# Patient Record
Sex: FEMALE | URBAN - METROPOLITAN AREA
[De-identification: names, ages, dates, MRNs, and addresses within clinical notes are randomized per-mention and may not be internally consistent; named-entity substitution may affect disease eponyms.]

---

## 2019-10-12 ENCOUNTER — INPATIENT (INPATIENT)
Facility: HOSPITAL | Age: 84
LOS: 5 days | Discharge: NURSING, REHAB OR LONG-TERM CARE | DRG: 551 | End: 2019-10-18
Attending: HOSPITALIST | Admitting: HOSPITALIST
Payer: MEDICARE

## 2019-10-12 VITALS
RESPIRATION RATE: 16 BRPM | HEART RATE: 88 BPM | DIASTOLIC BLOOD PRESSURE: 89 MMHG | OXYGEN SATURATION: 99 % | TEMPERATURE: 98 F | SYSTOLIC BLOOD PRESSURE: 196 MMHG

## 2019-10-12 DIAGNOSIS — I67.1 CEREBRAL ANEURYSM, NONRUPTURED: Chronic | ICD-10-CM

## 2019-10-12 DIAGNOSIS — Z90.710 ACQUIRED ABSENCE OF BOTH CERVIX AND UTERUS: Chronic | ICD-10-CM

## 2019-10-12 DIAGNOSIS — Z96.659 PRESENCE OF UNSPECIFIED ARTIFICIAL KNEE JOINT: Chronic | ICD-10-CM

## 2019-10-12 DIAGNOSIS — Z98.890 OTHER SPECIFIED POSTPROCEDURAL STATES: Chronic | ICD-10-CM

## 2019-10-12 LAB
ALBUMIN SERPL ELPH-MCNC: 4.1 G/DL — SIGNIFICANT CHANGE UP (ref 3.3–5)
ALP SERPL-CCNC: 78 U/L — SIGNIFICANT CHANGE UP (ref 40–120)
ALT FLD-CCNC: 22 U/L — SIGNIFICANT CHANGE UP (ref 10–45)
ANION GAP SERPL CALC-SCNC: 12 MMOL/L — SIGNIFICANT CHANGE UP (ref 5–17)
APPEARANCE UR: CLEAR — SIGNIFICANT CHANGE UP
APTT BLD: 28.4 SEC — SIGNIFICANT CHANGE UP (ref 27.5–36.3)
AST SERPL-CCNC: 24 U/L — SIGNIFICANT CHANGE UP (ref 10–40)
BACTERIA # UR AUTO: NEGATIVE — SIGNIFICANT CHANGE UP
BILIRUB SERPL-MCNC: 0.7 MG/DL — SIGNIFICANT CHANGE UP (ref 0.2–1.2)
BILIRUB UR-MCNC: NEGATIVE — SIGNIFICANT CHANGE UP
BUN SERPL-MCNC: 15 MG/DL — SIGNIFICANT CHANGE UP (ref 7–23)
CALCIUM SERPL-MCNC: 8.9 MG/DL — SIGNIFICANT CHANGE UP (ref 8.4–10.5)
CHLORIDE SERPL-SCNC: 103 MMOL/L — SIGNIFICANT CHANGE UP (ref 96–108)
CO2 SERPL-SCNC: 25 MMOL/L — SIGNIFICANT CHANGE UP (ref 22–31)
COLOR SPEC: YELLOW — SIGNIFICANT CHANGE UP
CREAT SERPL-MCNC: 0.85 MG/DL — SIGNIFICANT CHANGE UP (ref 0.5–1.3)
DIFF PNL FLD: NEGATIVE — SIGNIFICANT CHANGE UP
EPI CELLS # UR: 1 /HPF — SIGNIFICANT CHANGE UP
GLUCOSE SERPL-MCNC: 130 MG/DL — HIGH (ref 70–99)
GLUCOSE UR QL: NEGATIVE — SIGNIFICANT CHANGE UP
HCT VFR BLD CALC: 41.7 % — SIGNIFICANT CHANGE UP (ref 34.5–45)
HGB BLD-MCNC: 13.6 G/DL — SIGNIFICANT CHANGE UP (ref 11.5–15.5)
HYALINE CASTS # UR AUTO: 1 /LPF — SIGNIFICANT CHANGE UP (ref 0–2)
INR BLD: 1.06 RATIO — SIGNIFICANT CHANGE UP (ref 0.88–1.16)
KETONES UR-MCNC: NEGATIVE — SIGNIFICANT CHANGE UP
LEUKOCYTE ESTERASE UR-ACNC: NEGATIVE — SIGNIFICANT CHANGE UP
MCHC RBC-ENTMCNC: 30.2 PG — SIGNIFICANT CHANGE UP (ref 27–34)
MCHC RBC-ENTMCNC: 32.6 GM/DL — SIGNIFICANT CHANGE UP (ref 32–36)
MCV RBC AUTO: 92.7 FL — SIGNIFICANT CHANGE UP (ref 80–100)
NITRITE UR-MCNC: NEGATIVE — SIGNIFICANT CHANGE UP
NRBC # BLD: 0 /100 WBCS — SIGNIFICANT CHANGE UP (ref 0–0)
PH UR: 6 — SIGNIFICANT CHANGE UP (ref 5–8)
PLATELET # BLD AUTO: 274 K/UL — SIGNIFICANT CHANGE UP (ref 150–400)
POTASSIUM SERPL-MCNC: 4.1 MMOL/L — SIGNIFICANT CHANGE UP (ref 3.5–5.3)
POTASSIUM SERPL-SCNC: 4.1 MMOL/L — SIGNIFICANT CHANGE UP (ref 3.5–5.3)
PROT SERPL-MCNC: 7 G/DL — SIGNIFICANT CHANGE UP (ref 6–8.3)
PROT UR-MCNC: ABNORMAL
PROTHROM AB SERPL-ACNC: 12.2 SEC — SIGNIFICANT CHANGE UP (ref 10–12.9)
RBC # BLD: 4.5 M/UL — SIGNIFICANT CHANGE UP (ref 3.8–5.2)
RBC # FLD: 12.9 % — SIGNIFICANT CHANGE UP (ref 10.3–14.5)
RBC CASTS # UR COMP ASSIST: 3 /HPF — SIGNIFICANT CHANGE UP (ref 0–4)
SODIUM SERPL-SCNC: 140 MMOL/L — SIGNIFICANT CHANGE UP (ref 135–145)
SP GR SPEC: 1.02 — SIGNIFICANT CHANGE UP (ref 1.01–1.02)
TROPONIN T, HIGH SENSITIVITY RESULT: 15 NG/L — SIGNIFICANT CHANGE UP (ref 0–51)
UROBILINOGEN FLD QL: NEGATIVE — SIGNIFICANT CHANGE UP
WBC # BLD: 8.22 K/UL — SIGNIFICANT CHANGE UP (ref 3.8–10.5)
WBC # FLD AUTO: 8.22 K/UL — SIGNIFICANT CHANGE UP (ref 3.8–10.5)
WBC UR QL: 1 /HPF — SIGNIFICANT CHANGE UP (ref 0–5)

## 2019-10-12 RX ORDER — TETANUS TOXOID, REDUCED DIPHTHERIA TOXOID AND ACELLULAR PERTUSSIS VACCINE, ADSORBED 5; 2.5; 8; 8; 2.5 [IU]/.5ML; [IU]/.5ML; UG/.5ML; UG/.5ML; UG/.5ML
0.5 SUSPENSION INTRAMUSCULAR ONCE
Refills: 0 | Status: COMPLETED | OUTPATIENT
Start: 2019-10-12 | End: 2019-10-12

## 2019-10-12 RX ORDER — ACETAMINOPHEN 500 MG
975 TABLET ORAL ONCE
Refills: 0 | Status: COMPLETED | OUTPATIENT
Start: 2019-10-12 | End: 2019-10-12

## 2019-10-12 RX ADMIN — Medication 975 MILLIGRAM(S): at 21:00

## 2019-10-12 RX ADMIN — TETANUS TOXOID, REDUCED DIPHTHERIA TOXOID AND ACELLULAR PERTUSSIS VACCINE, ADSORBED 0.5 MILLILITER(S): 5; 2.5; 8; 8; 2.5 SUSPENSION INTRAMUSCULAR at 20:29

## 2019-10-12 RX ADMIN — Medication 975 MILLIGRAM(S): at 20:30

## 2019-10-12 NOTE — ED ADULT NURSE NOTE - NSIMPLEMENTINTERV_GEN_ALL_ED
Implemented All Universal Safety Interventions:  [place] to call system. Call bell, personal items and telephone within reach. Instruct patient to call for assistance. Room bathroom lighting operational. Non-slip footwear when patient is off stretcher. Physically safe environment: no spills, clutter or unnecessary equipment. Stretcher in lowest position, wheels locked, appropriate side rails in place. Specific interventions were implemented:

## 2019-10-12 NOTE — ED ADULT TRIAGE NOTE - BP NONINVASIVE DIASTOLIC (MM HG)
PROCEDURE NOTE: Lucentis 0.5mg PFS OD. Diagnosis: Neovascular AMD with Active CNV. Anesthesia: Akten Gel 3.5%. Prep: Betadine Flush. Prior to injection, risks/benefits/alternatives discussed including but not limited to infection, loss of vision or eye, hemorrhage, cataract, glaucoma, retinal tears or detachment. The patient wished to proceed with treatment. Topical anesthesia was induced with Alcaine. Additional anesthesia was achieved using drop(s) or injection checked above. A drop of Povidone-iodine 5% ophthalmic solution was instilled over the injection site and in the inferior fornix. Betadine prep was performed. A single use prefilled syringe of intravitreal Lucentis 0.5mg/0.05ml was used and excess discarded. The needle was passed 3.0 mm posterior to the limbus in pseudophakic patients, and 3.5 mm posterior to the limbus in phakic patients. The remainder of the Lucentis 0.5mg in the single-use vial was then discarded in a medical waste disposal container. The eye was irrigated with sterile irrigating solution. Patient tolerated the procedure well. There were no complications. Post procedure instructions given. CF vision checked. Injection Time 1:51PM. The patient was instructed to return for re-evaluation in approximately 4-12 weeks depending on his/her condition and was told to call immediately if vision decreases and/or if his/her eye becomes red, painful, and/or light sensitive. The patient was instructed to go to the emergency room or call 911 if unable to reach the doctor within an hour or two of trying or calling. Bisi Cordoba 89

## 2019-10-12 NOTE — ED PROVIDER NOTE - CARE PLAN
Principal Discharge DX:	Syncope Principal Discharge DX:	Syncope  Secondary Diagnosis:	Head injury  Secondary Diagnosis:	Neck pain on left side  Secondary Diagnosis:	Back pain

## 2019-10-12 NOTE — ED ADULT NURSE REASSESSMENT NOTE - NS ED NURSE REASSESS COMMENT FT1
EKG completed, pt placed on cardiac monitor showing NSR to the 90's. Safety and comfort measures provided and maintained, bed remains locked and in lowest position, side rails up for safety. Pt aware to call for assistance, call bell within reach, family remains at bedside.

## 2019-10-12 NOTE — ED PROVIDER NOTE - ATTENDING CONTRIBUTION TO CARE
83F BIBEMS s/p syncopal episode, per patient's daughter, neighbor witness patient getting out of her house onto her porch and then witnessed her collapsing to the ground. Patient does not know how she fell or what happened, does not remember falling. States that she woke up on the ground. Neighbor called EMS. Patient complaining of pain to head, mild, non-radiating, over abrasion, denies exacerbating/alleviating factors. Unknown last tetanus. Denies any dizziness/nausea/vomiting.    PMHx: Stroke (no residual, on Plavix), HTN, HLD, Hypothyroidism, aneurysm, carotid stenosis  PSHx: aneurysm clipping/repair; craniotomy, hysterectomy, TKR  Allergies: NKDA  SocHx: never smoker, denies drugs/ETOH.    PHYSICAL EXAMINATION:  VITALS REVIEWED.  Hypertensive, otherwise normal  GENERALIZED APPEARANCE:  Comfortable, no acute distress, ambulating without difficulty  SKIN:  Warm, dry, no cyanosis  HEAD:  Abrasion to L forehead with small hematoma, non-tender, bleeding  EYES:  Conjunctiva pink, no icterus  ENMT:  Mucus membranes moist, no stridor, PERRL, EOMI  NECK:  Supple, non-tender  CHEST AND RESPIRATORY:  Clear to auscultation B/L, good air entry B/L, equal chest expansion  HEART AND CARDIOVASCULAR:  Regular rate, no obvious murmur  ABDOMEN AND GI:  Soft, non-tender, non-distended.  No rebound, no guarding, no CVA-area tenderness  MSK: +Left cervical trapezium/paracervical tender without swelling or lesions. Lungs clear. +Left para thoracic/ posterior rib swelling/ tenderness and superficial abrasions.  EXTREMITIES:  No deformity, edema, or calf tenderness  NEURO: AAOx2 (knows name/place, does not know date/situation), gross motor and sensory intact    Impression:  Syncope, r/o bleed, r/o fx; r/o ACS; r/o UTI, r/o potassium abnormality, ?arrythmia, ?due to carotid stenosis; labs, imaging, tetanus, pain management given extensive history will require admission for syncope work up.

## 2019-10-12 NOTE — ED PROVIDER NOTE - PROGRESS NOTE DETAILS
[place] C-collar's changed, no cervical midline tender, left para cervical tender without swelling or lesions, Neuro- intact. Pt has left 1st metacarpal fx and informed to medicine Dr. Monson. Medicine team will consult a hand specialist in AM. Thumb spica applied. KAITLYN Hart MD: Rec'd signout on this patient who presented with fall s/p syncope, awaiting reads of CTH and CT C-spine. Rec'd call from radiology that CT C-spine shows C2 fx, CTA neck ordered as per radiology recommendations. NSG consulted for further eval. Pt remains in c-collar at this time, no new neurologic deficits noted.

## 2019-10-12 NOTE — ED ADULT NURSE NOTE - INTERVENTIONS DEFINITIONS
Reinforce activity limits and safety measures with patient and family/Physically safe environment: no spills, clutter or unnecessary equipment/Call bell, personal items and telephone within reach/[place] to call system/Stretcher in lowest position, wheels locked, appropriate side rails in place/Monitor for mental status changes and reorient to person, place, and time

## 2019-10-12 NOTE — ED PROVIDER NOTE - OBJECTIVE STATEMENT
84yo female pt with PMHx of Stroke (no residual, on Plavix), HTN, HLD, Hypothyroidism, was brought to ED by EMS c/o syncopal episode and fall this evening. Pt's A&Ox4 now. Pt stated she didn't not remember her fall and she found herself on the ground with head injury. As per her family, pt's neighbor saw her fall, pt fell down from her porch, and called 911. Denies dizziness, visual changes or N/V. Denies CP/SOB/ABD pain. Denies fever, chills or recent sickness. Denies urinary problems. Unknown last TD vaccination. 84yo female pt with PMHx of Stroke (no residual, on Plavix), HTN, HLD, Hypothyroidism, was brought to ED by EMS with cervical immobilization c/o syncopal episode and fall this evening. Pt's A&Ox4 now. Pt stated she didn't not remember her fall and she found herself on the ground with head injury. As per her family, pt's neighbor saw her fall, pt fell down from her porch, and called 911. Denies dizziness, visual changes or N/V. Denies CP/SOB/ABD pain. Denies fever, chills or recent sickness. Denies urinary problems. Unknown last TD vaccination. 84yo female pt with PMHx of Stroke (no residual, on Plavix), HTN, HLD, Hypothyroidism, was brought to ED by EMS with cervical immobilization c/o syncopal episode and fall this evening. Pt's A&Ox4 now. Pt stated she didn't not remember her fall and she found herself on the ground with head injury. As per her family, pt's neighbor saw her fall, pt fell down from her porch, and called 911. Denies dizziness, visual changes or N/V. Denies CP/SOB/ABD pain. Denies fever, chills or recent sickness. Denies urinary problems. Unknown last TD vaccination.  PMD- wKame Frances in [place]. 82yo female pt with PMHx of Stroke (no residual, on Plavix), Carotid Stenosis, HTN, HLD, Hypothyroidism, Brain Aneurysm s/p clipping, was brought to ED by EMS with cervical immobilization c/o syncopal episode and fall this evening. Pt's A&Ox4 now. Pt stated she didn't not remember her fall and she found herself on the ground with head injury. As per her family, pt's neighbor saw her fall, pt fell down from her porch, and called 911. Denies dizziness, visual changes or N/V. Denies CP/SOB/ABD pain. Denies fever, chills or recent sickness. Denies urinary problems. Unknown last TD vaccination.  PMD- Kwame Madrid in [place].

## 2019-10-12 NOTE — ED ADULT NURSE REASSESSMENT NOTE - NS ED NURSE REASSESS COMMENT FT1
As per war room, pt had an episode of inverted P waves on the cardiac monitor. MD Jimenez made aware, print out provided to MD. Safety and comfort measures maintained, repeat EKG completed and handed to MD.

## 2019-10-12 NOTE — ED ADULT NURSE NOTE - OBJECTIVE STATEMENT
84 y/o Female presenting to the ED by EMS, A&Ox3, after a witnessed single mechanical fall while outside her house, as per EMS report, no LOC noted during incident. Pt hit her head and is on Plavix. Pt arrived with a C-collar on and gauze wrapped on the forehead. Pt denies chest pain, shortness of breath, LOC, N/V/D, dizziness, blurry vision. Skin warm and dry. Safety and comfort measures provided and maintained, bed remains locked and in lowest position, side rails up for safety. Pt educated not to get up without assistance. 82 y/o Female presenting to the ED by EMS, A&Ox2, unable to answer orientation to time, as per family pt has difficulty getting her words out from a past stroke. Pt here after a witnessed single mechanical fall while outside her house, as per EMS report, no LOC noted during incident as per neighbor who witnessed the fall. However, pt reports she does not remember event Pt hit her head and is on Plavix. Pt arrived with a C-collar on and gauze wrapped on the forehead. Pt reports pain in the back side of her head and neck. Pt has had multiple falls in the past, as per family pt has not had any falls recently after discontinuation of her water pills. Pt denies chest pain, shortness of breath, LOC, N/V/D, dizziness, blurry vision. Skin warm and dry. Safety and comfort measures provided and maintained, bed remains locked and in lowest position, side rails up for safety. Pt educated not to get up without assistance. Family remains at bedside. Awaiting CT scan

## 2019-10-12 NOTE — ED PROVIDER NOTE - PHYSICAL EXAMINATION
NAD, A&Ox3, Hypertensive, Afebrile, + PERRL with full EOMs, + right forehead hematoma with superficial abrasions. No spinal midline tender. + Left cervical trapezium/ para cervical tender without swelling or lesions. Lungs clear. + Left para thoracic/ posterior rib swelling/ tender and superficial abrasions. No CVA tender. ABD soft, non tender. + Left buttock tender with full ROMS of hips. N/V- intact.

## 2019-10-12 NOTE — ED PROVIDER NOTE - PSH
Brain aneurysm  s/p repair  H/O abdominal hysterectomy    H/O total knee replacement    History of craniotomy

## 2019-10-12 NOTE — ED PROVIDER NOTE - PMH
Carotid stenosis    Hyperlipidemia    Hypertension    Hypothyroidism    Stroke  no residual deficits

## 2019-10-13 DIAGNOSIS — S62.309A UNSPECIFIED FRACTURE OF UNSPECIFIED METACARPAL BONE, INITIAL ENCOUNTER FOR CLOSED FRACTURE: ICD-10-CM

## 2019-10-13 DIAGNOSIS — I10 ESSENTIAL (PRIMARY) HYPERTENSION: ICD-10-CM

## 2019-10-13 DIAGNOSIS — R47.01 APHASIA: ICD-10-CM

## 2019-10-13 DIAGNOSIS — Z29.9 ENCOUNTER FOR PROPHYLACTIC MEASURES, UNSPECIFIED: ICD-10-CM

## 2019-10-13 DIAGNOSIS — S12.9XXA FRACTURE OF NECK, UNSPECIFIED, INITIAL ENCOUNTER: ICD-10-CM

## 2019-10-13 DIAGNOSIS — R55 SYNCOPE AND COLLAPSE: ICD-10-CM
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
## 2019-10-13 DIAGNOSIS — E03.9 HYPOTHYROIDISM, UNSPECIFIED: ICD-10-CM

## 2019-10-13 LAB — TROPONIN T, HIGH SENSITIVITY RESULT: 16 NG/L — SIGNIFICANT CHANGE UP (ref 0–51)

## 2019-10-13 PROCEDURE — 73130 X-RAY EXAM OF HAND: CPT | Mod: 26,LT

## 2019-10-13 PROCEDURE — 70498 CT ANGIOGRAPHY NECK: CPT | Mod: 26

## 2019-10-13 PROCEDURE — 12345: CPT | Mod: NC

## 2019-10-13 PROCEDURE — 99223 1ST HOSP IP/OBS HIGH 75: CPT | Mod: GC

## 2019-10-13 RX ORDER — TRAMADOL HYDROCHLORIDE 50 MG/1
25 TABLET ORAL ONCE
Refills: 0 | Status: DISCONTINUED | OUTPATIENT
Start: 2019-10-13 | End: 2019-10-13

## 2019-10-13 RX ORDER — LEVOTHYROXINE SODIUM 125 MCG
50 TABLET ORAL DAILY
Refills: 0 | Status: DISCONTINUED | OUTPATIENT
Start: 2019-10-13 | End: 2019-10-18

## 2019-10-13 RX ORDER — ACETAMINOPHEN 500 MG
650 TABLET ORAL ONCE
Refills: 0 | Status: COMPLETED | OUTPATIENT
Start: 2019-10-13 | End: 2019-10-13

## 2019-10-13 RX ORDER — SIMVASTATIN 20 MG/1
10 TABLET, FILM COATED ORAL AT BEDTIME
Refills: 0 | Status: DISCONTINUED | OUTPATIENT
Start: 2019-10-13 | End: 2019-10-17

## 2019-10-13 RX ADMIN — TRAMADOL HYDROCHLORIDE 25 MILLIGRAM(S): 50 TABLET ORAL at 20:12

## 2019-10-13 RX ADMIN — Medication 50 MICROGRAM(S): at 06:24

## 2019-10-13 RX ADMIN — SIMVASTATIN 10 MILLIGRAM(S): 20 TABLET, FILM COATED ORAL at 21:45

## 2019-10-13 RX ADMIN — Medication 650 MILLIGRAM(S): at 20:42

## 2019-10-13 RX ADMIN — TRAMADOL HYDROCHLORIDE 25 MILLIGRAM(S): 50 TABLET ORAL at 01:17

## 2019-10-13 RX ADMIN — Medication 650 MILLIGRAM(S): at 20:12

## 2019-10-13 NOTE — H&P ADULT - PROBLEM SELECTOR PLAN 5
vital signs per floor routine  c/w lisinopril 2.5mg qd (home dose) given patient hypertensive will check TSH level given hx hypothyroidism.   -c/w levothyroxine 50mcg qd for now

## 2019-10-13 NOTE — PROGRESS NOTE ADULT - SUBJECTIVE AND OBJECTIVE BOX
Prasad Boykin M.D. Pager Number [phone]    Patient is a 83y old  Female who presents with a chief complaint of cervical spine fracture (13 Oct 2019 04:38)      SUBJECTIVE / OVERNIGHT EVENTS:  Pt seen and examined at bedside in the presence of pt's nephew and niece. No acute events overnight. Complaining of back pain.   Pt denies cp, palpitations, sob, abd pain, N/V, fever, chills.    ROS:  All other review of systems negative    Allergies    No Known Allergies    Intolerances        MEDICATIONS  (STANDING):  levothyroxine 50 MICROGram(s) Oral daily  simvastatin 10 milliGRAM(s) Oral at bedtime    MEDICATIONS  (PRN):      Vital Signs Last 24 Hrs  T(C): 36.7 (13 Oct 2019 08:15), Max: 36.7 (13 Oct 2019 08:15)  T(F): 98 (13 Oct 2019 08:15), Max: 98 (13 Oct 2019 08:15)  HR: 69 (13 Oct 2019 08:15) (69 - 88)  BP: 157/81 (13 Oct 2019 08:15) (157/81 - 196/89)  BP(mean): --  RR: 18 (13 Oct 2019 08:15) (16 - 18)  SpO2: 96% (13 Oct 2019 08:15) (96% - 99%)  CAPILLARY BLOOD GLUCOSE        I&O's Summary    13 Oct 2019 07:01  -  13 Oct 2019 13:48  --------------------------------------------------------  IN: 0 mL / OUT: 600 mL / NET: -600 mL        PHYSICAL EXAM:  GENERAL: NAD, well-developed  HEAD:  Normocephalic, Right forehead hematoma  EYES: EOMI, PERRLA, conjunctiva and sclera clear  NECK: Cervical Collar in place  CHEST/LUNG: Clear to auscultation bilaterally; No wheeze  HEART: Regular rate and rhythm; No murmurs, rubs, or gallops  ABDOMEN: Soft, Nontender, Nondistended; Bowel sounds present  EXTREMITIES:  Left arm in splint with swelling and discoloration of digits.   NEUROLOGY: AAOx3, non-focal  PSYCH: calm  SKIN: No rashes or lesions    LABS:                        13.6   8.22  )-----------( 274      ( 12 Oct 2019 20:27 )             41.7     10-12    140  |  103  |  15  ----------------------------<  130<H>  4.1   |  25  |  0.85    Ca    8.9      12 Oct 2019 20:27    TPro  7.0  /  Alb  4.1  /  TBili  0.7  /  DBili  x   /  AST  24  /  ALT  22  /  AlkPhos  78  10-12    PT/INR - ( 12 Oct 2019 20:27 )   PT: 12.2 sec;   INR: 1.06 ratio         PTT - ( 12 Oct 2019 20:27 )  PTT:28.4 sec      Urinalysis Basic - ( 12 Oct 2019 22:18 )    Color: Yellow / Appearance: Clear / S.023 / pH: x  Gluc: x / Ketone: Negative  / Bili: Negative / Urobili: Negative   Blood: x / Protein: Trace / Nitrite: Negative   Leuk Esterase: Negative / RBC: 3 /hpf / WBC 1 /HPF   Sq Epi: x / Non Sq Epi: 1 /hpf / Bacteria: Negative        RADIOLOGY & ADDITIONAL TESTS:    Imaging Personally Reviewed:    Consultant(s) Notes Reviewed:      Care Discussed with Consultants/Other Providers:    Case Discussed with Family:    Goals of Care:

## 2019-10-13 NOTE — CONSULT NOTE ADULT - ASSESSMENT
83F s/p syncopal fall presents with neck pain and C2 pars fracture into the transverse foramen.     - Recommend CTA of the head and neck  - MRI C-spine due to midline tenderness on exam  - Keep C-collar for now  - Pain control  - Medicine eval for syncope

## 2019-10-13 NOTE — H&P ADULT - PROBLEM SELECTOR PLAN 1
unclear if syncopal vs mechanical fall as patient poor historian. will work up syncope w/ telemetry monitoring for arrythmia, obtain TTE to identify structural heart disease. CT head negative for acute infarct.  Check TSH level, orthostatics ?orthostatic hypotension. UA negative and patient w/o signs of infection.  -PT consult placed, social work c/s placed unclear if syncopal vs mechanical fall as patient poor historian. will work up syncope w/ telemetry monitoring for arrythmia, obtain TTE to identify structural heart disease. CT head negative for acute infarct.  Check TSH level, orthostatics ?orthostatic hypotension. UA negative and patient w/o signs of infection. b12, folate RPR   -PT consult placed, social work c/s placed unclear if syncopal vs mechanical fall as patient poor historian. will work up syncope w/ telemetry monitoring for arrythmia, obtain TTE to identify structural heart disease. CT head negative for acute infarct.  Check TSH level, orthostatics ?orthostatic hypotension. UA negative and patient w/o signs of infection. b12, folate, RPR r/o neuropathic causes of fall  -PT consult placed, social work c/s placed unclear if syncopal vs mechanical fall as patient poor historian. will work up syncope w/ telemetry monitoring for arrythmia, obtain TTE to identify structural heart disease. CT head negative for acute infarct.  Will get MR brain  Check TSH level, orthostatics ?orthostatic hypotension. UA negative and patient w/o signs of infection. b12, folate, RPR r/o neuropathic causes of fall  -PT consult placed, social work c/s placed Neurosurgery consulted for non-displaced C2 fracture through the left C2 pars into the transverse foramen, patient currently on [place] c-collar.   -CTA head and neck preformed for vasculature integrity given new cervical fracture pending official read  -MR c-spine ordered  -c/w Cervical collar until cleared by neurosurgery

## 2019-10-13 NOTE — PROGRESS NOTE ADULT - PROBLEM SELECTOR PLAN 1
-CT imaging reveals non-displaced C2 fracture through the left C2 pars into the transverse foramen  -Continue Cervical Collar  -Follow up with MRI  -Follow up with NSGY

## 2019-10-13 NOTE — CONSULT NOTE ADULT - SUBJECTIVE AND OBJECTIVE BOX
Pager: [phone]  CHIEF COMPLAINT/ REASON FOR CONSULTATION:    Neck pain     HPI:    82yo female pt with PMHx of Stroke (no residual, on Plavix), Carotid Stenosis, HTN, HLD, Hypothyroidism, Brain Aneurysm (in the 1970s) s/p clipping, was brought to ED by EMS with cervical immobilization c/o syncopal episode and fall this evening. Pt's A&Ox4 now. Pt stated she didn't not remember her fall and she found herself on the ground with head injury. As per her family, pt's neighbor saw her fall, pt fell down from her porch, and called 911. Denies dizziness, visual changes or N/V. Denies CP/SOB/ABD pain. Denies fever, chills or recent sickness. Denies urinary problems.    She reports some neck pain and was placed in a C-collar. Denies any weakness or paresthesias. CT of the neck was performed and demonstrated a non-dysplaced fracture through the left C2 pars into the transverse foramen.     PAST MEDICAL HISTORY   Carotid stenosis  Hypothyroidism  Hyperlipidemia  Hypertension  Stroke    PAST SURGICAL HISTORY   H/O abdominal hysterectomy  Brain aneurysm  H/O total knee replacement  History of craniotomy        MEDICATIONS:  Antibiotics:    Neuro:    Anticoagulation: Plavix     Other:      SOCIAL HISTORY:   Occupation:   Marital Status:     FAMILY HISTORY:      REVIEW OF SYSTEMS:  Check here if all are normal other than Neurological []  Negative except as above     PHYSICAL EXAMINATION:   T(C): 36.3 (10-13-19 @ 01:34), Max: 36.6 (10-12-19 @ 22:07)  HR: 81 (10-13-19 @ 01:34) (81 - 88)  BP: 167/83 (10-13-19 @ 01:34) (159/88 - 196/89)  RR: 18 (10-13-19 @ 01:34) (16 - 18)  SpO2: 97% (10-13-19 @ 01:34) (97% - 99%)  Wt(kg): --    Exam:  Awake, Alert, AOX3  PERRL, EOMI, Face equal, Tongue m/l  5/5 throughout, no drift  SILT    In C-collar  Pt reports significant midline tenderness     LABS:                        13.6   8.22  )-----------( 274      ( 12 Oct 2019 20:27 )             41.7     10-    140  |  103  |  15  ----------------------------<  130<H>  4.1   |  25  |  0.85    Ca    8.9      12 Oct 2019 20:27    TPro  7.0  /  Alb  4.1  /  TBili  0.7  /  DBili  x   /  AST  24  /  ALT  22  /  AlkPhos  78  10-12    PT/INR - ( 12 Oct 2019 20:27 )   PT: 12.2 sec;   INR: 1.06 ratio         PTT - ( 12 Oct 2019 20:27 )  PTT:28.4 sec  Urinalysis Basic - ( 12 Oct 2019 22:18 )    Color: Yellow / Appearance: Clear / S.023 / pH: x  Gluc: x / Ketone: Negative  / Bili: Negative / Urobili: Negative   Blood: x / Protein: Trace / Nitrite: Negative   Leuk Esterase: Negative / RBC: 3 /hpf / WBC 1 /HPF   Sq Epi: x / Non Sq Epi: 1 /hpf / Bacteria: Negative        RADIOLOGY & ADDITIONAL STUDIES:  < from: CT Cervical Spine No Cont (10.12.19 @ 21:57) >  IMPRESSION:    CT head: No displaced calvarial fracture or acute intracranial   hemorrhage. Large right frontal scalp hematoma. Chronic infarcts, as   above.    CT cervical spine: Acute fracture of the left aspect of C2 as described   above.  Recommend CTA of the cervical spine to exclude vascular injury.    Findings discussed with Dr Hart at 1:25 am on 10/13/19 with [facility]BV.    < end of copied text >

## 2019-10-13 NOTE — PROGRESS NOTE ADULT - PROBLEM SELECTOR PLAN 2
-Hx consistent with mechanical fall down flight of stairs outside the house  -Follow up with Syncope workup  -Follow up with PT

## 2019-10-13 NOTE — ED PROCEDURE NOTE - NS ED PERI VASCULAR NEG
no paresthesia/fingers/toes warm to touch/no cyanosis of extremity/capillary refill time < 2 seconds/swelling prior to the splint

## 2019-10-13 NOTE — H&P ADULT - PROBLEM SELECTOR PLAN 3
Seen on x-ray of left hand on 1st metacarpal bone. SPICA splint placed in ER. Will consult hand specialist in AM for further evaluation and management.

## 2019-10-13 NOTE — H&P ADULT - PROBLEM SELECTOR PLAN 6
IMPROVE score:1  given new fall w/ hematoma for DVT ppx will start b/l SCDs   Diet: DASH/TLC   fall/aspiration precautions. vital signs per floor routine  c/w lisinopril 2.5mg qd (home dose) given patient hypertensive vital signs per floor routine  will hold lisinopril 2.5mg qd (home dose) given unclear if acute stroke(would want to allow permissive hypertension), if MRI negative can resume vital signs per floor routine  c/w lisinopril 2.5mg qd (home dose)

## 2019-10-13 NOTE — H&P ADULT - PROBLEM SELECTOR PLAN 2
Neurosurgery consulted for non-displaced C2 fracture through the left C2 pars into the transverse foramen, patient currently on [place] c-collar.   -CTA head and neck preformed for vasculature integrity given new cervical fracture pending official read  -MR c-spine ordered  -c/w Cervical collar until cleared by neurosurgery unclear if syncopal vs mechanical fall as patient poor historian. will work up syncope w/ telemetry monitoring for arrythmia, obtain TTE to identify structural heart disease. CT head negative for acute infarct.  Will get MR brain  Check TSH level, orthostatics ?orthostatic hypotension. UA negative and patient w/o signs of infection. b12, folate, RPR r/o neuropathic causes of fall  -PT consult placed, social work c/s placed

## 2019-10-13 NOTE — H&P ADULT - NSHPLABSRESULTS_GEN_ALL_CORE
.  Labs reviewed personally.                          13.6   8.22  )-----------( 274      ( 12 Oct 2019 20:27 )             41.7     Hgb Trend: 13.6<--  10-12    140  |  103  |  15  ----------------------------<  130<H>  4.1   |  25  |  0.85    Ca    8.9      12 Oct 2019 20:27    TPro  7.0  /  Alb  4.1  /  TBili  0.7  /  DBili  x   /  AST  24  /  ALT  22  /  AlkPhos  78  10    Creatinine Trend: 0.85<--  PT/INR - ( 12 Oct 2019 20:27 )   PT: 12.2 sec;   INR: 1.06 ratio         PTT - ( 12 Oct 2019 20:27 )  PTT:28.4 sec  Urinalysis Basic - ( 12 Oct 2019 22:18 )    Color: Yellow / Appearance: Clear / S.023 / pH: x  Gluc: x / Ketone: Negative  / Bili: Negative / Urobili: Negative   Blood: x / Protein: Trace / Nitrite: Negative   Leuk Esterase: Negative / RBC: 3 /hpf / WBC 1 /HPF   Sq Epi: x / Non Sq Epi: 1 /hpf / Bacteria: Negative    Imaging reviewed personally.  CT head: No displaced calvarial fracture or acute intracranial hemorrhage. Large right frontal scalp hematoma. Chronic infarcts, as above.  CT cervical spine: Acute fracture of the left aspect of C2 as described above.  Recommend CTA of the cervical spine to exclude vascular injury.    EKG: NSR w/ premature supraventricular complexes rate 82, [id number] Labs reviewed personally.                          13.6   8.22  )-----------( 274      ( 12 Oct 2019 20:27 )             41.7     Hgb Trend: 13.6<--  10-12    140  |  103  |  15  ----------------------------<  130<H>  4.1   |  25  |  0.85    Ca    8.9      12 Oct 2019 20:27    TPro  7.0  /  Alb  4.1  /  TBili  0.7  /  DBili  x   /  AST  24  /  ALT  22  /  AlkPhos  78  10    Creatinine Trend: 0.85<--  PT/INR - ( 12 Oct 2019 20:27 )   PT: 12.2 sec;   INR: 1.06 ratio         PTT - ( 12 Oct 2019 20:27 )  PTT:28.4 sec  Urinalysis Basic - ( 12 Oct 2019 22:18 )    Color: Yellow / Appearance: Clear / S.023 / pH: x  Gluc: x / Ketone: Negative  / Bili: Negative / Urobili: Negative   Blood: x / Protein: Trace / Nitrite: Negative   Leuk Esterase: Negative / RBC: 3 /hpf / WBC 1 /HPF   Sq Epi: x / Non Sq Epi: 1 /hpf / Bacteria: Negative    Imaging reviewed personally.  CT head: No displaced calvarial fracture or acute intracranial hemorrhage. Large right frontal scalp hematoma. Chronic infarcts, as above.  CT cervical spine: Acute fracture of the left aspect of C2 as described above.  Recommend CTA of the cervical spine to exclude vascular injury.  XR pelvis:   Osseous structure adjacent to the superior aspect of the   left hip joint is likely related to degenerative changes rather than a   fracture.     If clinical suspicion persists, additional radiographic views of the left   hip may be obtained.    XR wrist: official report pending    CTA head/neck official report pending    EKG reviewed: NSR w/ premature supraventricular complexes rate 82, [id number]

## 2019-10-13 NOTE — PROGRESS NOTE ADULT - PROBLEM SELECTOR PLAN 3
-Xray of left hand revealed 1st metacarpal bone fracture  -S/p SPICA splint in ER.   -Follow up with Ortho/Hand specialist for evaluation and management.

## 2019-10-13 NOTE — H&P ADULT - NSHPPHYSICALEXAM_GEN_ALL_CORE
Gen: awake, alert, NAD  HENT: neck soft / supple, +subcutaneous hematoma on right side forehead  Lymph: no LAD noted in neck  Eye: PERRL, sclerae anicteric, ROM intact   CV: normal rate, regular rhythm, no murmers   Pulm: CTAB, no w/r/r auscultated  Abd: +BS, soft, NT, ND  Skin: warm, dry  Ext: no LE edema  Neuro: AOx2 (name, , location does not known date or president.   Psych: normal mood / affect Gen: awake, alert, NAD  HENT: neck soft / supple, +subcutaneous hematoma on right side forehead  Lymph: no LAD noted in neck  Eye: PERRL, sclerae anicteric, ROM intact   CV: normal rate, regular rhythm, no murmurs   Pulm: CTAB, no w/r/r auscultated  Abd: +BS, soft, NT, ND  Skin: warm, dry  Ext: no LE edema  Neuro: AOx2 (name, , location does not known date or president) 5/5 strngth upper and lower extremities,    Psych: normal mood / affect

## 2019-10-13 NOTE — H&P ADULT - ASSESSMENT
82 y/o Female hx Stroke on ASA&plavix, carotid stenosis, HTN, HLD, Hypothyroidism, Brain aneurysm s/p clipping presenting to the ED BIBEMS after witnessed fall in setting of possible vaso-vagal response found to have acute fracture of cervical spine & 1st metacarpal left hand.

## 2019-10-13 NOTE — H&P ADULT - HISTORY OF PRESENT ILLNESS
82 y/o Female hx Stroke on ASA&plavix, carotid stenosis, HTN, HLD, Hypothyroidism, Brain aneurysm s/p clipping presenting to the ED BIBEMS after witnessed fall.     Patient is poor historian, however reports not losing consciousness and slipping after standing from porch. She states she is unsure of exactly what happened but states she might of been slightly dizzy when she stood up. She denies any chest pain, palpations, SOB at the time. Chart notes have conflicting history, stating patient had LOC however patient denies this. Denies any fevers, chills, cough, urinary symptoms. 84 y/o Female hx Stroke (?residual aphasia) on ASA&plavix, carotid stenosis, HTN, HLD, Hypothyroidism, Brain aneurysm s/p clipping, dementia, presenting to the ED BIBEMS after witnessed fall.     Patient is poor historian, however reports not losing consciousness and slipping after standing from porch. She states she is unsure of exactly what happened but states she might of been slightly dizzy when she stood up. She denies any chest pain, palpations, SOB at the time. Chart notes have conflicting history, stating patient had LOC however patient denies this. Denies any fevers, chills, cough, urinary symptoms.

## 2019-10-13 NOTE — PROGRESS NOTE ADULT - PROBLEM SELECTOR PLAN 4
old,  CT brain w/ old Left frontoparietal encephalomalacia/gliosis and ex vacuo dilatation of the left lateral ventricle. Chronic lacunar infarct in the right centrum which may be contributory.  MRI brain ordered

## 2019-10-13 NOTE — H&P ADULT - NSICDXPASTMEDICALHX_GEN_ALL_CORE_FT
PAST MEDICAL HISTORY:  Carotid stenosis     Hyperlipidemia     Hypertension     Hypothyroidism     Stroke no residual deficits

## 2019-10-13 NOTE — H&P ADULT - PROBLEM SELECTOR PLAN 7
IMPROVE score:1  given new fall w/ hematoma for DVT ppx will start b/l SCDs   Diet: DASH/TLC   fall/aspiration precautions. IMPROVE score:1  given new fall w/ hematoma for DVT ppx will start b/l SCDs   Diet: DASH/TLC however will keep NPO w/ meds for now pending S&S evaluation given c2 fracture   fall/aspiration precautions.

## 2019-10-13 NOTE — H&P ADULT - PROBLEM SELECTOR PLAN 4
will check TSH level given hx hypothyroidism.   -c/w levothyroxine 50mcg qd for now Unclear if new or old, however CT brain w/ old Left frontoparietal encephalomalacia/gliosis and ex vacuo dilatation of the left lateral ventricle. Chronic lacunar infarct in the right centrum which may be contributory.   Will obtain MRI brain to r/o acute stroke.   semiovale. old,  CT brain w/ old Left frontoparietal encephalomalacia/gliosis and ex vacuo dilatation of the left lateral ventricle. Chronic lacunar infarct in the right centrum which may be contributory. old,  CT brain w/ old Left frontoparietal encephalomalacia/gliosis and ex vacuo dilatation of the left lateral ventricle. Chronic lacunar infarct in the right centrum which may be contributory.  MRI brain ordered

## 2019-10-13 NOTE — H&P ADULT - NSICDXPASTSURGICALHX_GEN_ALL_CORE_FT
PAST SURGICAL HISTORY:  Brain aneurysm s/p repair    H/O abdominal hysterectomy     H/O total knee replacement     History of craniotomy

## 2019-10-13 NOTE — PROGRESS NOTE ADULT - PROBLEM SELECTOR PLAN 7
IMPROVE score:1  given new fall w/ hematoma for DVT ppx will start b/l SCDs   Diet: DASH/TLC however will keep NPO w/ meds for now pending S&S evaluation given c2 fracture   fall/aspiration precautions. IMPROVE score:1  given new fall w/ hematoma for DVT ppx will start b/l SCDs   Diet: Mechanical soft diet  fall/aspiration precautions.

## 2019-10-13 NOTE — H&P ADULT - NSHPREVIEWOFSYSTEMS_GEN_ALL_CORE
Gen: denies fever, chills  HENT: denies throat pain, nasal congestion, +neck pain  Eye: denies changes in vision, eye pain  CV: denies chest pain, palpitations, +dizziness   Pulm: denies SOB, cough  Abd: denies abd pain, N/V/D/C  : denies hematuria, dysuria  Skin: denies rash, itching  Ext: denies LE edema, pain  Neuro: denies HA, +dizziness, lightheadedness

## 2019-10-14 LAB
ANION GAP SERPL CALC-SCNC: 9 MMOL/L — SIGNIFICANT CHANGE UP (ref 5–17)
APTT BLD: 27 SEC — LOW (ref 27.5–36.3)
BASOPHILS # BLD AUTO: 0.02 K/UL — SIGNIFICANT CHANGE UP (ref 0–0.2)
BASOPHILS NFR BLD AUTO: 0.3 % — SIGNIFICANT CHANGE UP (ref 0–2)
BUN SERPL-MCNC: 8 MG/DL — SIGNIFICANT CHANGE UP (ref 7–23)
CALCIUM SERPL-MCNC: 8.9 MG/DL — SIGNIFICANT CHANGE UP (ref 8.4–10.5)
CHLORIDE SERPL-SCNC: 104 MMOL/L — SIGNIFICANT CHANGE UP (ref 96–108)
CO2 SERPL-SCNC: 26 MMOL/L — SIGNIFICANT CHANGE UP (ref 22–31)
CREAT SERPL-MCNC: 0.62 MG/DL — SIGNIFICANT CHANGE UP (ref 0.5–1.3)
EOSINOPHIL # BLD AUTO: 0.12 K/UL — SIGNIFICANT CHANGE UP (ref 0–0.5)
EOSINOPHIL NFR BLD AUTO: 1.6 % — SIGNIFICANT CHANGE UP (ref 0–6)
FOLATE SERPL-MCNC: >20 NG/ML — SIGNIFICANT CHANGE UP
GLUCOSE SERPL-MCNC: 122 MG/DL — HIGH (ref 70–99)
HCT VFR BLD CALC: 37.1 % — SIGNIFICANT CHANGE UP (ref 34.5–45)
HGB BLD-MCNC: 12.6 G/DL — SIGNIFICANT CHANGE UP (ref 11.5–15.5)
IMM GRANULOCYTES NFR BLD AUTO: 0.5 % — SIGNIFICANT CHANGE UP (ref 0–1.5)
INR BLD: 1.08 RATIO — SIGNIFICANT CHANGE UP (ref 0.88–1.16)
LYMPHOCYTES # BLD AUTO: 0.82 K/UL — LOW (ref 1–3.3)
LYMPHOCYTES # BLD AUTO: 10.8 % — LOW (ref 13–44)
MAGNESIUM SERPL-MCNC: 2.1 MG/DL — SIGNIFICANT CHANGE UP (ref 1.6–2.6)
MCHC RBC-ENTMCNC: 31.2 PG — SIGNIFICANT CHANGE UP (ref 27–34)
MCHC RBC-ENTMCNC: 34 GM/DL — SIGNIFICANT CHANGE UP (ref 32–36)
MCV RBC AUTO: 91.8 FL — SIGNIFICANT CHANGE UP (ref 80–100)
MONOCYTES # BLD AUTO: 0.9 K/UL — SIGNIFICANT CHANGE UP (ref 0–0.9)
MONOCYTES NFR BLD AUTO: 11.9 % — SIGNIFICANT CHANGE UP (ref 2–14)
NEUTROPHILS # BLD AUTO: 5.66 K/UL — SIGNIFICANT CHANGE UP (ref 1.8–7.4)
NEUTROPHILS NFR BLD AUTO: 74.9 % — SIGNIFICANT CHANGE UP (ref 43–77)
NRBC # BLD: 0 /100 WBCS — SIGNIFICANT CHANGE UP (ref 0–0)
PHOSPHATE SERPL-MCNC: 2.2 MG/DL — LOW (ref 2.5–4.5)
PLATELET # BLD AUTO: 222 K/UL — SIGNIFICANT CHANGE UP (ref 150–400)
POTASSIUM SERPL-MCNC: 3.9 MMOL/L — SIGNIFICANT CHANGE UP (ref 3.5–5.3)
POTASSIUM SERPL-SCNC: 3.9 MMOL/L — SIGNIFICANT CHANGE UP (ref 3.5–5.3)
PROTHROM AB SERPL-ACNC: 12.4 SEC — SIGNIFICANT CHANGE UP (ref 10–12.9)
RBC # BLD: 4.04 M/UL — SIGNIFICANT CHANGE UP (ref 3.8–5.2)
RBC # FLD: 13 % — SIGNIFICANT CHANGE UP (ref 10.3–14.5)
SODIUM SERPL-SCNC: 139 MMOL/L — SIGNIFICANT CHANGE UP (ref 135–145)
T PALLIDUM AB TITR SER: NEGATIVE — SIGNIFICANT CHANGE UP
VIT B12 SERPL-MCNC: 1135 PG/ML — SIGNIFICANT CHANGE UP (ref 232–1245)
WBC # BLD: 7.56 K/UL — SIGNIFICANT CHANGE UP (ref 3.8–10.5)
WBC # FLD AUTO: 7.56 K/UL — SIGNIFICANT CHANGE UP (ref 3.8–10.5)

## 2019-10-14 PROCEDURE — 93306 TTE W/DOPPLER COMPLETE: CPT | Mod: 26

## 2019-10-14 PROCEDURE — 99233 SBSQ HOSP IP/OBS HIGH 50: CPT

## 2019-10-14 PROCEDURE — 70551 MRI BRAIN STEM W/O DYE: CPT | Mod: 26

## 2019-10-14 PROCEDURE — 72141 MRI NECK SPINE W/O DYE: CPT | Mod: 26

## 2019-10-14 RX ORDER — ACETAMINOPHEN 500 MG
650 TABLET ORAL EVERY 6 HOURS
Refills: 0 | Status: DISCONTINUED | OUTPATIENT
Start: 2019-10-14 | End: 2019-10-18

## 2019-10-14 RX ORDER — BACITRACIN ZINC 500 UNIT/G
1 OINTMENT IN PACKET (EA) TOPICAL
Refills: 0 | Status: DISCONTINUED | OUTPATIENT
Start: 2019-10-14 | End: 2019-10-18

## 2019-10-14 RX ADMIN — Medication 650 MILLIGRAM(S): at 17:42

## 2019-10-14 RX ADMIN — Medication 1 APPLICATION(S): at 17:42

## 2019-10-14 RX ADMIN — Medication 650 MILLIGRAM(S): at 18:00

## 2019-10-14 RX ADMIN — SIMVASTATIN 10 MILLIGRAM(S): 20 TABLET, FILM COATED ORAL at 22:09

## 2019-10-14 RX ADMIN — Medication 50 MICROGRAM(S): at 06:33

## 2019-10-14 NOTE — PHYSICAL THERAPY INITIAL EVALUATION ADULT - IMPAIRMENTS CONTRIBUTING TO GAIT DEVIATIONS, PT EVAL
decreased strength/impaired balance/pain/impaired postural control/decreased endurance ,mod unsteady

## 2019-10-14 NOTE — PROGRESS NOTE ADULT - PROBLEM SELECTOR PLAN 2
-Hx consistent with mechanical fall down flight of stairs outside the house  -Follow up with Syncope workup  -Follow up with PT history per my acocunt differnent than prior. Patient states fell inside where history initially states fell down outside steps  -patient states fell twice more in the past few months, never sought medical care, this time with no prodrome, does not remember how fell down stairs  -TTE normal, but given facial trauma, thumb trauma, unclear whether ?TIA, synocpe.  -agree with MR head.

## 2019-10-14 NOTE — PHYSICAL THERAPY INITIAL EVALUATION ADULT - PERTINENT HX OF CURRENT PROBLEM, REHAB EVAL
83yoF PMH Stroke (?residual aphasia) on ASA&plavix, carotid stenosis, HTN, HLD, Hypothyroidism, Brain aneurysm s/p clipping, dementia, presenting to the ED BIBEMS after witnessed fall  in setting of possible vaso-vagal response found to have acute fracture of cervical spine (C2 pars fracture into the transverse foramen) & 1st metacarpal left hand. 83yoF PMH Stroke (?residual aphasia) on ASA&plavix, carotid stenosis, HTN, HLD, Hypothyroidism, Brain aneurysm s/p clipping, dementia, presenting to the ED BIBEMS after witnessed fall  in setting of possible vaso-vagal response found to have acute fracture of cervical spine (C2 pars fracture into the transverse foramen) & 1st metacarpal left hand acute displaced comminuted fx of 1 st MC HEad neck junction on XRAY L Hand ; on MRi Cervical Spine : C2 foramen transversarium fx 83yoF PMH Stroke (?residual aphasia) on ASA&plavix, carotid stenosis, HTN, HLD, Hypothyroidism, Brain aneurysm s/p clipping, dementia, presenting to the ED BIBEMS after witnessed fall stepping into porch  in setting of possible vaso-vagal response found to have acute fracture of cervical spine (C2 pars fracture into the transverse foramen) & 1st metacarpal left hand acute displaced comminuted fx of 1 st MC HEad neck junction on XRAY L Hand ; on MRi Cervical Spine : C2 foramen transversarium fx 83yoF PMH Stroke (?residual aphasia) on ASA& plavix, carotid stenosis, HTN, HLD, Hypothyroidism, Brain aneurysm s/p clipping, dementia, presenting to the ED BIBEMS after witnessed fall stepping into porch  in setting of possible vaso-vagal response found to have acute fracture of cervical spine (C2 pars fracture into the transverse foramen) & 1st metacarpal left hand acute displaced comminuted fx of 1 st MC HEad neck junction on XRAY L Hand ; on MRi Cervical Spine : C2 foramen transversarium fx

## 2019-10-14 NOTE — PROGRESS NOTE ADULT - SUBJECTIVE AND OBJECTIVE BOX
Patient seen and examined at bedside.    --Anticoagulation--    T(C): 36.4 (10-14-19 @ 04:15), Max: 36.8 (10-13-19 @ 21:31)  HR: 79 (10-14-19 @ 04:15) (69 - 79)  BP: 160/80 (10-14-19 @ 04:15) (148/78 - 167/76)  RR: 18 (10-14-19 @ 04:15) (18 - 18)  SpO2: 96% (10-14-19 @ 04:15) (94% - 97%)  Wt(kg): --    Exam: Awake, Alert, AOX3  PERRL, EOMI, Face equal, Tongue m/l  5/5 throughout, no drift  SILT

## 2019-10-14 NOTE — PHYSICAL THERAPY INITIAL EVALUATION ADULT - PLANNED THERAPY INTERVENTIONS, PT EVAL
bed mobility training/stair train GOAL : pt will negotiate 4 steps with hand rail w/ supervision in 3 -4 weeks/transfer training/balance training/gait training/strengthening

## 2019-10-14 NOTE — PHYSICAL THERAPY INITIAL EVALUATION ADULT - IMPAIRMENTS FOUND, PT EVAL
gait, locomotion, and balance/joint integrity and mobility/muscle strength/posture/aerobic capacity/endurance

## 2019-10-14 NOTE — PHYSICAL THERAPY INITIAL EVALUATION ADULT - REFERRING PHYSICIAN, REHAB EVAL
Dr. Patricia. PT Eval & Tx Dr. Patricia. PT Eval & Tx; per Dr Parker pt clear from Neurosurgery for PT no surgical intervention at this time

## 2019-10-14 NOTE — PROGRESS NOTE ADULT - SUBJECTIVE AND OBJECTIVE BOX
Patient is a 83y old  Female who presents with a chief complaint of cervical spine fracture (13 Oct 2019 04:38)      SUBJECTIVE / OVERNIGHT EVENTS:  patient seen and evalutated. Upon further discussion of event, patient states she went outside and then back inside and the next thing she knew she was on the floor. While she states she did not lose consciousness, she did not have any recollection of how she felll, states did not feel dizzy or lightheadedness.  Patient with mild cervical pain, no pain in her hand.   ROS:  All other review of systems negative    Allergies    No Known Allergies    Intolerances        MEDICATIONS  (STANDING):  levothyroxine 50 MICROGram(s) Oral daily  simvastatin 10 milliGRAM(s) Oral at bedtime    MEDICATIONS  (PRN):      Vital Signs Last 24 Hrs  T(C): 36.7 (14 Oct 2019 13:12), Max: 36.8 (13 Oct 2019 21:31)  T(F): 98 (14 Oct 2019 13:12), Max: 98.2 (13 Oct 2019 21:31)  HR: 79 (14 Oct 2019 13:12) (73 - 79)  BP: 175/80 (14 Oct 2019 13:12) (148/78 - 175/80)  BP(mean): --  RR: 18 (14 Oct 2019 13:12) (18 - 18)  SpO2: 95% (14 Oct 2019 13:12) (94% - 96%)      I&O's Summary    13 Oct 2019 07:01  -  13 Oct 2019 13:48  --------------------------------------------------------  IN: 0 mL / OUT: 600 mL / NET: -600 mL        PHYSICAL EXAM:  GENERAL: NAD, well-developed  HEAD:  Normocephalic, Right forehead hematoma  EYES: EOMI, PERRLA, conjunctiva and sclera clear  NECK: Cervical Collar in place  CHEST/LUNG: Clear to auscultation bilaterally; No wheeze  HEART: Regular rate and rhythm; No murmurs, rubs, or gallops  ABDOMEN: Soft, Nontender, Nondistended; Bowel sounds present  EXTREMITIES:  Left arm in splint with swelling and discoloration of digits.   NEUROLOGY: AAOx3, non-focal  PSYCH: calm  SKIN: No rashes or lesions    LABS:                        13.6   8.22  )-----------( 274      ( 12 Oct 2019 20:27 )             41.7     10-12    140  |  103  |  15  ----------------------------<  130<H>  4.1   |  25  |  0.85    Ca    8.9      12 Oct 2019 20:27    TPro  7.0  /  Alb  4.1  /  TBili  0.7  /  DBili  x   /  AST  24  /  ALT  22  /  AlkPhos  78  10-12    PT/INR - ( 12 Oct 2019 20:27 )   PT: 12.2 sec;   INR: 1.06 ratio         PTT - ( 12 Oct 2019 20:27 )  PTT:28.4 sec      Urinalysis Basic - ( 12 Oct 2019 22:18 )    Color: Yellow / Appearance: Clear / S.023 / pH: x  Gluc: x / Ketone: Negative  / Bili: Negative / Urobili: Negative   Blood: x / Protein: Trace / Nitrite: Negative   Leuk Esterase: Negative / RBC: 3 /hpf / WBC 1 /HPF   Sq Epi: x / Non Sq Epi: 1 /hpf / Bacteria: Negative        RADIOLOGY & ADDITIONAL TESTS:    Imaging Personally Reviewed:    Consultant(s) Notes Reviewed:      Care Discussed with Consultants/Other Providers:    Case Discussed with Family:    Goals of Care: Patient is a 83y old  Female who presents with a chief complaint of cervical spine fracture (13 Oct 2019 04:38)      SUBJECTIVE / OVERNIGHT EVENTS:  patient seen and evalutated. Upon further discussion of event, patient states she went outside and then back inside and the next thing she knew she was on the floor. While she states she did not lose consciousness, she did not have any recollection of how she felll, states did not feel dizzy or lightheadedness.  Patient with mild cervical pain, no pain in her hand.   ROS:  All other review of systems negative    Allergies    No Known Allergies    Intolerances        MEDICATIONS  (STANDING):  levothyroxine 50 MICROGram(s) Oral daily  simvastatin 10 milliGRAM(s) Oral at bedtime    MEDICATIONS  (PRN):      Vital Signs Last 24 Hrs  T(C): 36.7 (14 Oct 2019 13:12), Max: 36.8 (13 Oct 2019 21:31)  T(F): 98 (14 Oct 2019 13:12), Max: 98.2 (13 Oct 2019 21:31)  HR: 79 (14 Oct 2019 13:12) (73 - 79)  BP: 175/80 (14 Oct 2019 13:12) (148/78 - 175/80)  BP(mean): --  RR: 18 (14 Oct 2019 13:12) (18 - 18)  SpO2: 95% (14 Oct 2019 13:12) (94% - 96%)      I&O's Summary    13 Oct 2019 07:01  -  13 Oct 2019 13:48  --------------------------------------------------------  IN: 0 mL / OUT: 600 mL / NET: -600 mL        PHYSICAL EXAM:  GENERA: NAD  HEAD:  Normocephalic, Right forehead hematoma  EYES: EOMI, PERRLA, conjunctiva and sclera clear  NECK: Cervical Collar in place  CHEST/LUNG: Clear to auscultation bilaterally; No wheeze  HEART: Regular rate and rhythm; No murmurs, rubs, or gallops  ABDOMEN: Soft, Nontender, Nondistended; Bowel sounds present  EXTREMITIES:  Left arm in splint with swelling and discoloration of digits.   NEUROLOGY: AAOx3, 5/5 UE and LE strength.   PSYCH: calm  SKIN: No rashes or lesions  Tele: No events.   LABS:                                   12.6   7.56  )-----------( 222      ( 14 Oct 2019 06:55 )             37.1     10-14    139  |  104  |  8   ----------------------------<  122<H>  3.9   |  26  |  0.62    Ca    8.9      14 Oct 2019 06:55  Phos  2.2     10-  Mg     2.1     10-14    TPro  7.0  /  Alb  4.1  /  TBili  0.7  /  DBili  x   /  AST  24  /  ALT  22  /  AlkPhos  78  10-      Color: Yellow / Appearance: Clear / S.023 / pH: x  Gluc: x / Ketone: Negative  / Bili: Negative / Urobili: Negative   Blood: x / Protein: Trace / Nitrite: Negative   Leuk Esterase: Negative / RBC: 3 /hpf / WBC 1 /HPF   Sq Epi: x / Non Sq Epi: 1 /hpf / Bacteria: Negative        RADIOLOGY & ADDITIONAL TESTS:    Imaging Personally Reviewed:    Consultant(s) Notes Reviewed:      Care Discussed with Consultants/Other Providers: Vascular cardiology attending,  neurosurgery resident, hand surgrey attending.     Case Discussed with Family:    Goals of Care:

## 2019-10-14 NOTE — PROGRESS NOTE ADULT - SUBJECTIVE AND OBJECTIVE BOX
Consult received and pending MRI of the C spine (and subsequent eval and clearance from Neurosurgery) will be able to evaluate patient fully and assess from a vascular medicine perspective if patient is a candidate for any endovascular intervention of the carotid artery stenosis. In the interim consider stroke neurologist eval.   Full consult to follow on 10/15/2019.

## 2019-10-14 NOTE — PHYSICAL THERAPY INITIAL EVALUATION ADULT - GENERAL OBSERVATIONS, REHAB EVAL
pt received in bed + external urinary device to wall suction , + cervical collar + tele SR 60-70's + splint L wrist spica over thumb c, d, I ace wrap c,d I L fingers ecchymotic min mod edema

## 2019-10-14 NOTE — CHART NOTE - NSCHARTNOTEFT_GEN_A_CORE
MEDICINE PA     EVENT SUMMARY     IMPRESSION:    < from: MR Cervical Spine No Cont (10.14.19 @ 18:02) >      EXAM:  MR SPINE CERVICAL                          EXAM:  MR BRAIN        Right frontal scalp hematoma, left frontotemporal craniotomy, left   frontal temporal and parietal encephalomalacia, lateral ventricle ex   vacuo enlargement, partially thrombosed left paraclinoid medially   projecting 1.2 x 1.1 cm aneurysm.    Volume loss and microvascular disease, no restricted diffusion, or   midline shift.    EGA dens fracture extending to left C2 C1-C2 articular facet please see   corresponding CT neck and CTA, abnormal hyperintense STIR signal along   the right lateral C4 and C5 facets with extension to the transverse   foramina additional fractures not excluded, alteration of the flow-void   within the right vertebral artery at this level, MRA with fat saturation   and attention to the vessels may better assess for possible small   dissection.    Redemonstration of a decreased size in caliber of the left common and   left internal carotid artery segment, as noted on CTA,. Dominant patent   right internal carotid artery,    Findings discussed with FRANCHESCA Clinton at immediate time review on 10/14/2019   at 6:40 PM.    < end of copied text >    NeuroSx made aware of the above results; no new recommendation at this time. C/w C- Collar and no acute surgical intervention. F/u with NSx in AM. Will continue to monitor.     Viviana DIXON  #[id number]

## 2019-10-14 NOTE — PROGRESS NOTE ADULT - PROBLEM SELECTOR PLAN 4
old,  CT brain w/ old Left frontoparietal encephalomalacia/gliosis and ex vacuo dilatation of the left lateral ventricle. Chronic lacunar infarct in the right centrum which may be contributory.  MRI brain ordered no further events but given aphasia and fall with severe carotid stenosis on L., MR brain is appropriate.

## 2019-10-14 NOTE — PHYSICAL THERAPY INITIAL EVALUATION ADULT - BED MOBILITY LIMITATIONS, REHAB EVAL
decreased ability to use legs for bridging/pushing/impaired ability to control trunk for mobility/pt sat x 8 min feet on floor and using Rue for support  cg of 1 intermittent min/decreased ability to use arms for pushing/pulling

## 2019-10-14 NOTE — PHYSICAL THERAPY INITIAL EVALUATION ADULT - GAIT DEVIATIONS NOTED, PT EVAL
decreased step length/decreased rosalinda/decreased stride length/decreased weight-shifting ability/increased time in double stance

## 2019-10-14 NOTE — PROGRESS NOTE ADULT - PROBLEM SELECTOR PLAN 3
-Xray of left hand revealed 1st metacarpal bone fracture  -S/p SPICA splint in ER.   -Follow up with Ortho/Hand specialist for evaluation and management. discussed personally with hand surgeon.  -this is an unstable fx, however no acuity to place pins.  -will see patient, but we will stabilize patient from other medical issues and hten will address need for surgery

## 2019-10-14 NOTE — PROGRESS NOTE ADULT - PROBLEM SELECTOR PLAN 1
-CT imaging reveals non-displaced C2 fracture through the left C2 pars into the transverse foramen  -Continue Cervical Collar  -Follow up with MRI  -Follow up with NSGY -CT imaging reveals non-displaced C2 fracture through the left C2 pars into the transverse foramen  -Continue Cervical Collar  -discussed with neurosurgery reisdent who states patient "cleared" from their standpoint without need for MR, but wanted to continue cervical collar.   -however given vascular carotid stenosis and possible need for intubation, will keep in MR cervical spine to look for ligamental damage and see if can remove C-collar.   -neurologically intact

## 2019-10-14 NOTE — PHYSICAL THERAPY INITIAL EVALUATION ADULT - ADDITIONAL COMMENTS
pt lives alone in house w/ 4 steps to enter with HR then no steps inside ; pt uses std cane PTA , walk in shower with shower seat

## 2019-10-14 NOTE — CHART NOTE - NSCHARTNOTEFT_GEN_A_CORE
83F s/p syncopal fall presents with neck pain and C2 pars fracture into the transverse foramen.   - imaging reviewed.   - CTA Negative  - Keep C-collar until outpatient follow up  - No acute neurosurgical intervention. Please follow up with Dr. Miranda 1 week after discharge.   - Continue with Pain control

## 2019-10-14 NOTE — PHYSICAL THERAPY INITIAL EVALUATION ADULT - TRANSFER TRAINING, PT EVAL
GOAL: pt will transfer sit-stand at [facility] cane with cg of 1 in 2-3 weeks

## 2019-10-14 NOTE — PROGRESS NOTE ADULT - PROBLEM SELECTOR PLAN 7
IMPROVE score:1  given new fall w/ hematoma for DVT ppx will start b/l SCDs   Diet: Mechanical soft diet  fall/aspiration precautions.

## 2019-10-14 NOTE — PHYSICAL THERAPY INITIAL EVALUATION ADULT - PRECAUTIONS/LIMITATIONS, REHAB EVAL
fall precautions/age indeterminate compression deformitiy T2 and T4 vertebra ; HCT L 1.1x 1.2 cm suprasellar paraclinoid likely thrombosed L supraclinoid ICA aneurysm; WBC 12.13 elevated fall precautions/age indeterminate compression deformity T2 and T4 vertebra ; HCT L 1.1x 1.2 cm suprasellar paraclinoid likely thrombosed L supraclinoid ICA aneurysm; WBC 12.13 elevated

## 2019-10-14 NOTE — PHYSICAL THERAPY INITIAL EVALUATION ADULT - IMPAIRMENTS CONTRIBUTING IMPAIRED BED MOBILITY, REHAB EVAL
impaired postural control/decreased endurance , pain in sitting4/10 lanette Sandra in gave tylenol/decreased strength/impaired balance/pain

## 2019-10-14 NOTE — PHYSICAL THERAPY INITIAL EVALUATION ADULT - IMPAIRED TRANSFERS: SIT/STAND, REHAB EVAL
impaired postural control/impaired balance/pain/pt decreased endurance , retropulsed in stand work on weight shift and balance pt using std cane in r hand mod of 1/decreased strength

## 2019-10-14 NOTE — PHYSICAL THERAPY INITIAL EVALUATION ADULT - ASR WT BEARING STATUS EVAL
no weight-bearing restrictions L hand kept NWb for now due to fx thumb/Left UE/no weight-bearing restrictions

## 2019-10-14 NOTE — PHYSICAL THERAPY INITIAL EVALUATION ADULT - ACTIVE RANGE OF MOTION EXAMINATION, REHAB EVAL
following spinal precautions/bilateral upper extremity Active ROM was WFL (within functional limits)/bilateral  lower extremity Active ROM was WFL (within functional limits)

## 2019-10-15 LAB
ANION GAP SERPL CALC-SCNC: 11 MMOL/L — SIGNIFICANT CHANGE UP (ref 5–17)
BUN SERPL-MCNC: 8 MG/DL — SIGNIFICANT CHANGE UP (ref 7–23)
CALCIUM SERPL-MCNC: 8.7 MG/DL — SIGNIFICANT CHANGE UP (ref 8.4–10.5)
CHLORIDE SERPL-SCNC: 105 MMOL/L — SIGNIFICANT CHANGE UP (ref 96–108)
CO2 SERPL-SCNC: 23 MMOL/L — SIGNIFICANT CHANGE UP (ref 22–31)
CREAT SERPL-MCNC: 0.6 MG/DL — SIGNIFICANT CHANGE UP (ref 0.5–1.3)
GLUCOSE SERPL-MCNC: 98 MG/DL — SIGNIFICANT CHANGE UP (ref 70–99)
HCT VFR BLD CALC: 38.9 % — SIGNIFICANT CHANGE UP (ref 34.5–45)
HGB BLD-MCNC: 12.7 G/DL — SIGNIFICANT CHANGE UP (ref 11.5–15.5)
MCHC RBC-ENTMCNC: 30.5 PG — SIGNIFICANT CHANGE UP (ref 27–34)
MCHC RBC-ENTMCNC: 32.6 GM/DL — SIGNIFICANT CHANGE UP (ref 32–36)
MCV RBC AUTO: 93.3 FL — SIGNIFICANT CHANGE UP (ref 80–100)
NRBC # BLD: 0 /100 WBCS — SIGNIFICANT CHANGE UP (ref 0–0)
PLATELET # BLD AUTO: 249 K/UL — SIGNIFICANT CHANGE UP (ref 150–400)
POTASSIUM SERPL-MCNC: 3.9 MMOL/L — SIGNIFICANT CHANGE UP (ref 3.5–5.3)
POTASSIUM SERPL-SCNC: 3.9 MMOL/L — SIGNIFICANT CHANGE UP (ref 3.5–5.3)
RBC # BLD: 4.17 M/UL — SIGNIFICANT CHANGE UP (ref 3.8–5.2)
RBC # FLD: 13.2 % — SIGNIFICANT CHANGE UP (ref 10.3–14.5)
SODIUM SERPL-SCNC: 139 MMOL/L — SIGNIFICANT CHANGE UP (ref 135–145)
WBC # BLD: 11.65 K/UL — HIGH (ref 3.8–10.5)
WBC # FLD AUTO: 11.65 K/UL — HIGH (ref 3.8–10.5)

## 2019-10-15 PROCEDURE — 99223 1ST HOSP IP/OBS HIGH 75: CPT

## 2019-10-15 PROCEDURE — 70549 MR ANGIOGRAPH NECK W/O&W/DYE: CPT | Mod: 26

## 2019-10-15 PROCEDURE — 99233 SBSQ HOSP IP/OBS HIGH 50: CPT

## 2019-10-15 PROCEDURE — 70546 MR ANGIOGRAPH HEAD W/O&W/DYE: CPT | Mod: 26

## 2019-10-15 RX ORDER — ACETAMINOPHEN 500 MG
1000 TABLET ORAL ONCE
Refills: 0 | Status: COMPLETED | OUTPATIENT
Start: 2019-10-15 | End: 2019-10-15

## 2019-10-15 RX ADMIN — Medication 650 MILLIGRAM(S): at 23:00

## 2019-10-15 RX ADMIN — Medication 650 MILLIGRAM(S): at 15:19

## 2019-10-15 RX ADMIN — Medication 1000 MILLIGRAM(S): at 19:00

## 2019-10-15 RX ADMIN — Medication 50 MICROGRAM(S): at 05:37

## 2019-10-15 RX ADMIN — Medication 650 MILLIGRAM(S): at 16:05

## 2019-10-15 RX ADMIN — Medication 1 APPLICATION(S): at 16:08

## 2019-10-15 RX ADMIN — Medication 400 MILLIGRAM(S): at 18:55

## 2019-10-15 RX ADMIN — SIMVASTATIN 10 MILLIGRAM(S): 20 TABLET, FILM COATED ORAL at 22:02

## 2019-10-15 RX ADMIN — Medication 1 APPLICATION(S): at 05:40

## 2019-10-15 RX ADMIN — Medication 650 MILLIGRAM(S): at 22:03

## 2019-10-15 NOTE — CONSULT NOTE ADULT - SUBJECTIVE AND OBJECTIVE BOX
Vascular Cardiology Consult Note     [place] [id number]              EMAIL [email or web address]   OFFICE [phone]    CC:  L common carotid stenosis and L internal carotid artery stenosis    HPI:    84 y/o F with PMH of CVA ( ? residual aphasia     For PE:  Provoked/Unprovoked? (long haul air travel, immobility, surgery)  Duration of symptoms?  Leg pain or swelling?  Cancer Screening: (mammo, pap, colonoscopy, prostate?)  Syncope?  Prior VTE?  Family history of VTE?  OCP use?  Prior bleeding?    For PAD:  Claudication?  Ulceration on feet?  Prior vascular testing?  Podiatrist?  Smoker?  On antiplatelet and statin therapy?  Concern for infection/cellulitis?  Renal function?  Prior stents/intervention?  Allergy to contrast?           Allergies    No Known Allergies    Intolerances    	    MEDICATIONS:        acetaminophen   Tablet .. 650 milliGRAM(s) Oral every 6 hours PRN      levothyroxine 50 MICROGram(s) Oral daily  simvastatin 10 milliGRAM(s) Oral at bedtime    BACItracin   Ointment 1 Application(s) Topical two times a day      PAST MEDICAL & SURGICAL HISTORY:  Carotid stenosis  Hypothyroidism  Hyperlipidemia  Hypertension  Stroke: no residual deficits  H/O abdominal hysterectomy  Brain aneurysm: s/p repair  H/O total knee replacement  History of craniotomy      FAMILY HISTORY:  No pertinent family history: stroke      SOCIAL HISTORY:  unchanged    REVIEW OF SYSTEMS:  CONSTITUTIONAL: No fever, weight loss, or fatigue  EYES: No eye pain, visual disturbances, or discharge  ENMT:  No difficulty hearing, tinnitus, vertigo; No sinus or throat pain  NECK: No pain or stiffness  RESPIRATORY: No cough, wheezing, chills or hemoptysis; No Shortness of Breath    CARDIOVASCULAR: No chest pain, palpitations, passing out, dizziness, or leg swelling    GASTROINTESTINAL: No abdominal or epigastric pain. No nausea, vomiting, or hematemesis; No diarrhea or constipation. No melena or hematochezia.  GENITOURINARY: No dysuria, frequency, hematuria, or incontinence  NEUROLOGICAL: No headaches, memory loss, loss of strength, numbness, or tremors  SKIN: No itching, burning, rashes, or lesions   LYMPH Nodes: No enlarged glands  ENDOCRINE: No heat or cold intolerance; No hair loss  MUSCULOSKELETAL: No joint pain or swelling; No muscle, back, or extremity pain  PSYCHIATRIC: No depression, anxiety, mood swings, or difficulty sleeping  HEME/LYMPH: No easy bruising, or bleeding gums  ALLERY AND IMMUNOLOGIC: No hives or eczema	    [ x] All others negative	  [ ] Unable to obtain    PHYSICAL EXAM:  T(C): 36.7 (10-15-19 @ 04:37), Max: 36.8 (10-14-19 @ 21:13)  HR: 80 (10-15-19 @ 04:37) (71 - 80)  BP: 159/73 (10-15-19 @ 04:37) (152/78 - 175/80)  RR: 18 (10-15-19 @ 04:37) (18 - 18)  SpO2: 96% (10-15-19 @ 04:37) (95% - 98%)  Wt(kg): --  I&O's Summary    14 Oct 2019 07:01  -  15 Oct 2019 07:00  --------------------------------------------------------  IN: 510 mL / OUT: 1550 mL / NET: -1040 mL        Appearance: Normal	  HEENT:   Normal oral mucosa, PERRL, EOMI	  Carotid:   Right:  No Bruit      Left:  No bruit  Lymphatic: No lymphadenopathy  Cardiovascular: Normal S1 S2, No JVD, No murmurs  Respiratory: Lungs clear to auscultation	  Psychiatry: A & O x 3, Mood & affect appropriate  Gastrointestinal:  Soft, Non-tender, + BS	  Skin: No rashes, No ecchymoses, No cyanosis	  Neurologic: Non-focal  Extremities: Normal range of motion.    Vascular Pulse Exam:  Right Femoral:  Palpable       Left Femoral:  Palpable  Right Popliteal:  Palpable      Left Popliteal:  Palpable  Right DP:  Palpable                 Left DP:  Palpable  Right PT:  Palpable                 Left DP:  Palpable    Foot Exam:  Warm, no ulceration.        LABS:	 	    CBC Full  -  ( 15 Oct 2019 07:12 )  WBC Count : 11.65 K/uL  Hemoglobin : 12.7 g/dL  Hematocrit : 38.9 %  Platelet Count - Automated : 249 K/uL  Mean Cell Volume : 93.3 fl  Mean Cell Hemoglobin : 30.5 pg  Mean Cell Hemoglobin Concentration : 32.6 gm/dL  Auto Neutrophil # : x  Auto Lymphocyte # : x  Auto Monocyte # : x  Auto Eosinophil # : x  Auto Basophil # : x  Auto Neutrophil % : x  Auto Lymphocyte % : x  Auto Monocyte % : x  Auto Eosinophil % : x  Auto Basophil % : x    10-15    139  |  105  |  8   ----------------------------<  98  3.9   |  23  |  0.60  10-14    139  |  104  |  8   ----------------------------<  122<H>  3.9   |  26  |  0.62    Ca    8.7      15 Oct 2019 07:11  Ca    8.9      14 Oct 2019 06:55  Phos  2.2     10-14  Mg     2.1     10-14            Assessment:  1.            Plan:  1.          Thank you      Vascular Cardiology Service    Please call with any questions:   SPECTRA - [id number]  Office [phone]  email:  [email or web address] Vascular Cardiology Consult Note     [place] [id number]              EMAIL [email or web address]   OFFICE [phone]    CC:      HPI:    DO NOT COPY FORWARD HPI       Allergies    No Known Allergies    Intolerances    	    MEDICATIONS:        acetaminophen   Tablet .. 650 milliGRAM(s) Oral every 6 hours PRN      levothyroxine 50 MICROGram(s) Oral daily  simvastatin 10 milliGRAM(s) Oral at bedtime    BACItracin   Ointment 1 Application(s) Topical two times a day      PAST MEDICAL & SURGICAL HISTORY:  Carotid stenosis  Hypothyroidism  Hyperlipidemia  Hypertension  Stroke: no residual deficits  H/O abdominal hysterectomy  Brain aneurysm: s/p repair  H/O total knee replacement  History of craniotomy      FAMILY HISTORY:  No pertinent family history: stroke      SOCIAL HISTORY:  unchanged    REVIEW OF SYSTEMS:  CONSTITUTIONAL: No fever, weight loss, or fatigue  EYES: No eye pain, visual disturbances, or discharge  ENMT:  No difficulty hearing, tinnitus, vertigo; No sinus or throat pain  NECK: No pain or stiffness  RESPIRATORY:    CARDIOVASCULAR:    GASTROINTESTINAL: No abdominal or epigastric pain. No nausea, vomiting, or hematemesis; No diarrhea or constipation. No melena or hematochezia.  GENITOURINARY: No dysuria, frequency, hematuria, or incontinence  NEUROLOGICAL: No headaches, memory loss, loss of strength, numbness, or tremors  SKIN:   LYMPH Nodes: No enlarged glands  ENDOCRINE: No heat or cold intolerance; No hair loss  MUSCULOSKELETAL: No joint pain or swelling; No muscle, back, or extremity pain  PSYCHIATRIC: No depression, anxiety, mood swings, or difficulty sleeping  HEME/LYMPH: No easy bruising, or bleeding gums  ALLERY AND IMMUNOLOGIC: No hives or eczema	    [ x] All others negative	  [ ] Unable to obtain    PHYSICAL EXAM:  T(C): 36.7 (10-15-19 @ 04:37), Max: 36.8 (10-14-19 @ 21:13)  HR: 80 (10-15-19 @ 04:37) (71 - 80)  BP: 159/73 (10-15-19 @ 04:37) (152/78 - 175/80)  RR: 18 (10-15-19 @ 04:37) (18 - 18)  SpO2: 96% (10-15-19 @ 04:37) (95% - 98%)  Wt(kg): --  I&O's Summary    14 Oct 2019 07:01  -  15 Oct 2019 07:00  --------------------------------------------------------  IN: 510 mL / OUT: 1550 mL / NET: -1040 mL        Appearance:  	  HEENT:   Normal oral mucosa, PERRL, EOMI	  Carotid:   Right:    Left:    Lymphatic: No lymphadenopathy  Cardiovascular:    Respiratory:  	  Psychiatry:  AAO x   Gastrointestinal:  Soft, Non-tender, + BS	  Skin: No rashes, No ecchymoses, No cyanosis	  Neurologic:    Extremities:      Vascular Pulse Exam:  Right DP: []palpable []non-palpable []audible      Left DP :   []palpable []non-palpable []audible  Right PT: []palpable [] non-palpable []audible   Left PT:  [] palpable [] non-palpable []audible         Foot Exam:        LABS:	 	    CBC Full  -  ( 15 Oct 2019 07:12 )  WBC Count : 11.65 K/uL  Hemoglobin : 12.7 g/dL  Hematocrit : 38.9 %  Platelet Count - Automated : 249 K/uL  Mean Cell Volume : 93.3 fl  Mean Cell Hemoglobin : 30.5 pg  Mean Cell Hemoglobin Concentration : 32.6 gm/dL  Auto Neutrophil # : x  Auto Lymphocyte # : x  Auto Monocyte # : x  Auto Eosinophil # : x  Auto Basophil # : x  Auto Neutrophil % : x  Auto Lymphocyte % : x  Auto Monocyte % : x  Auto Eosinophil % : x  Auto Basophil % : x    10-15    139  |  105  |  8   ----------------------------<  98  3.9   |  23  |  0.60  10-14    139  |  104  |  8   ----------------------------<  122<H>  3.9   |  26  |  0.62    Ca    8.7      15 Oct 2019 07:11  Ca    8.9      14 Oct 2019 06:55  Phos  2.2     10-14  Mg     2.1     10-14            Assessment:  1.            Plan:  1.          Thank you      Vascular Cardiology Service    Please call with any questions:   [place]  [id number]  Office [phone]  email:  [email or web address] Vascular Cardiology Consult Note     [place] [id number]              EMAIL [email or web address]   OFFICE [phone]    CC:  L common carotid stenosis and L internal carotid artery stenosis    HPI:    84 y/o Female PMH ? CVA (s/p Craniotomy 40 yrs ago/ on ASA & plavix), Carotid stenosis, HTN, HLD, Hypothyroidism, Brain aneurysm (s/p clipping) presenting to the ED BIBEMS after witnessed fall in setting of possible vaso-vagal response found to have with neck pain and C2 pars fracture into the transverse foramen & 1st metacarpal left hand. Patient is a poor historian states; that she has had multiple falls in the past which she states that occasionally these falls are associated with ? dizziness.   Patient was assessed by Neurosurgery and current recommendations were for C- collar to remain until outpatient follow up and no need for acute neurosurgical intervention.   Vascular medicine eval for left common carotid artery with progressive narrowing to occlusion beginning at its origin, with reconstitution at its bifurcation and stenosis of the L ICA; patient is unaware fo any prior diagnosis of this and unable to say if she has had any prior workup   Further denies any chest pain, shortness of breath, radiation to the neck,        Allergies  No Known Allergies    Intolerances    MEDICATIONS:  acetaminophen   Tablet .. 650 milliGRAM(s) Oral every 6 hours PRN  levothyroxine 50 MICROGram(s) Oral daily  simvastatin 10 milliGRAM(s) Oral at bedtime  BACItracin   Ointment 1 Application(s) Topical two times a day      PAST MEDICAL & SURGICAL HISTORY:  Carotid stenosis  Hypothyroidism  Hyperlipidemia  Hypertension  Stroke: no residual deficits  H/O abdominal hysterectomy  Brain aneurysm: s/p repair  H/O total knee replacement  History of craniotomy      FAMILY HISTORY:  No pertinent family history: stroke      SOCIAL HISTORY:  unchanged    REVIEW OF SYSTEMS:  CONSTITUTIONAL: No fever, weight loss, or fatigue  EYES: No eye pain, visual disturbances, or discharge  ENMT:  No difficulty hearing, tinnitus, vertigo; No sinus or throat pain  NECK: No pain or stiffness  RESPIRATORY:  no shortness of breath, cough, dyspnea   CARDIOVASCULAR:  no chest pain, shortness of breath, lower extremity edema   GASTROINTESTINAL: No abdominal or epigastric pain. No nausea, vomiting, or hematemesis; No diarrhea or constipation. No melena or hematochezia.  GENITOURINARY: No dysuria, frequency, hematuria, or incontinence  NEUROLOGICAL: No headaches, memory loss, loss of strength, numbness, or tremors  SKIN: no skin changes   LYMPH Nodes: No enlarged glands  ENDOCRINE: No heat or cold intolerance; No hair loss  MUSCULOSKELETAL: No joint pain or swelling; No muscle, back, or extremity pain  PSYCHIATRIC: No depression, anxiety, mood swings, or difficulty sleeping  HEME/LYMPH: No easy bruising, or bleeding gums  ALLERY AND IMMUNOLOGIC: No hives or eczema	    [ x] All others negative	  [ ] Unable to obtain    PHYSICAL EXAM:  T(C): 36.7 (10-15-19 @ 04:37), Max: 36.8 (10-14-19 @ 21:13)  HR: 80 (10-15-19 @ 04:37) (71 - 80)  BP: 159/73 (10-15-19 @ 04:37) (152/78 - 175/80)  RR: 18 (10-15-19 @ 04:37) (18 - 18)  SpO2: 96% (10-15-19 @ 04:37) (95% - 98%)  Wt(kg): --  I&O's Summary    14 Oct 2019 07:01  -  15 Oct 2019 07:00  --------------------------------------------------------  IN: 510 mL / OUT: 1550 mL / NET: -1040 mL        Appearance:  	  HEENT:   Normal oral mucosa, PERRL, EOMI; + post surgical changes on the frontal region of the and + L frontal hematoma   Carotid: UNABLE TO ASSESS DUE TO C- COLLAR IN PLACE   Lymphatic: No lymphadenopathy  Cardiovascular:  + s1 s2 RRR  Respiratory:  CTA b/l no wheezing rhonchi or rales   Psychiatry:  AAO x 3  Gastrointestinal:  Soft, Non-tender, + BS	  Skin: No rashes, No ecchymoses, No cyanosis	  Neurologic:  no focal neurologic deficits   Extremities:  no edema cyanosis or clubbing     Vascular Pulse Exam:  Right DP: []palpable []non-palpable []audible      Left DP :   []palpable []non-palpable []audible  Right PT: []palpable [] non-palpable []audible   Left PT:  [] palpable [] non-palpable []audible         Foot Exam:        LABS:	 	    CBC Full  -  ( 15 Oct 2019 07:12 )  WBC Count : 11.65 K/uL  Hemoglobin : 12.7 g/dL  Hematocrit : 38.9 %  Platelet Count - Automated : 249 K/uL  Mean Cell Volume : 93.3 fl  Mean Cell Hemoglobin : 30.5 pg  Mean Cell Hemoglobin Concentration : 32.6 gm/dL  Auto Neutrophil # : x  Auto Lymphocyte # : x  Auto Monocyte # : x  Auto Eosinophil # : x  Auto Basophil # : x  Auto Neutrophil % : x  Auto Lymphocyte % : x  Auto Monocyte % : x  Auto Eosinophil % : x  Auto Basophil % : x    10-15    139  |  105  |  8   ----------------------------<  98  3.9   |  23  |  0.60  10-14    139  |  104  |  8   ----------------------------<  122<H>  3.9   |  26  |  0.62    Ca    8.7      15 Oct 2019 07:11  Ca    8.9      14 Oct 2019 06:55  Phos  2.2     10-14  Mg     2.1     10-14    CTA of the neck:  Left common carotid artery demonstrates progressive narrowing to   occlusion beginning at its origin, with reconstitution at its   bifurcation. Findings are likely chronic in nature.    Left internal carotid artery demonstrates progressive narrowing beginning   at its margin, and then long segment moderate to severe stenosis   throughout its course in the neck        Assessment:  84 y/o Female PMH ? 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  - MRA of the cervical spine: decreased size in caliber of the left common and  left internal carotid artery segment, as noted on CTA,. Dominant patent  right internal carotid artery,      Plan:  - recommend US duplex of the carotid arteries (when feasible as patient has a c-collar on)   - if not contraindicated from a Neurosurgical stand point start ASA 81mg po q daily and obtain Lipid Panel and start high intensity statin (Crestor 20mg po q daily / Lipitor 40mg )    - recommend optimal blood pressure management and control   - no acute endovascular intervention of the L ICA at this time due to acute ongoing medical conditions; recommend follow up in the outpatient setting   - Neurosurgery input appreciated     Thank you  Salima Beck D.O.     Vascular Cardiology Service    Please call with any questions:   SPECTRA - [id number]  Office [phone]  email:  [email or web address] Vascular Cardiology Consult Note     [place] [id number]              EMAIL [email or web address]   OFFICE [phone]    CC:  L common carotid stenosis and L internal carotid artery stenosis    HPI:    82 y/o Female PMH ? CVA (s/p Craniotomy 40 yrs ago/ on ASA & plavix), Carotid stenosis, HTN, HLD, Hypothyroidism, Brain aneurysm (s/p clipping) presenting to the ED BIBEMS after witnessed fall in setting of possible vaso-vagal response found to have with neck pain and C2 pars fracture into the transverse foramen & 1st metacarpal left hand. Patient is a poor historian states; that she has had multiple falls in the past which she states that occasionally these falls are associated with ? dizziness.   Patient was assessed by Neurosurgery and current recommendations were for C- collar to remain until outpatient follow up and no need for acute neurosurgical intervention.   Vascular medicine eval for left common carotid artery with progressive narrowing to occlusion beginning at its origin, with reconstitution at its bifurcation and stenosis of the L ICA; patient is unaware fo any prior diagnosis of this and unable to say if she has had any prior workup   Further denies any chest pain, shortness of breath, radiation to the neck,        Allergies  No Known Allergies    Intolerances    MEDICATIONS:  acetaminophen   Tablet .. 650 milliGRAM(s) Oral every 6 hours PRN  levothyroxine 50 MICROGram(s) Oral daily  simvastatin 10 milliGRAM(s) Oral at bedtime  BACItracin   Ointment 1 Application(s) Topical two times a day      PAST MEDICAL & SURGICAL HISTORY:  Carotid stenosis  Hypothyroidism  Hyperlipidemia  Hypertension  Stroke: no residual deficits  H/O abdominal hysterectomy  Brain aneurysm: s/p repair  H/O total knee replacement  History of craniotomy      FAMILY HISTORY:  No pertinent family history: stroke      SOCIAL HISTORY:  unchanged    REVIEW OF SYSTEMS:  CONSTITUTIONAL: No fever, weight loss, or fatigue  EYES: No eye pain, visual disturbances, or discharge  ENMT:  No difficulty hearing, tinnitus, vertigo; No sinus or throat pain  NECK: No pain or stiffness  RESPIRATORY:  no shortness of breath, cough, dyspnea   CARDIOVASCULAR:  no chest pain, shortness of breath, lower extremity edema   GASTROINTESTINAL: No abdominal or epigastric pain. No nausea, vomiting, or hematemesis; No diarrhea or constipation. No melena or hematochezia.  GENITOURINARY: No dysuria, frequency, hematuria, or incontinence  NEUROLOGICAL: No headaches, memory loss, loss of strength, numbness, or tremors  SKIN: no skin changes   LYMPH Nodes: No enlarged glands  ENDOCRINE: No heat or cold intolerance; No hair loss  MUSCULOSKELETAL: No joint pain or swelling; No muscle, back, or extremity pain  PSYCHIATRIC: No depression, anxiety, mood swings, or difficulty sleeping  HEME/LYMPH: No easy bruising, or bleeding gums  ALLERY AND IMMUNOLOGIC: No hives or eczema	    [ x] All others negative	  [ ] Unable to obtain    PHYSICAL EXAM:  T(C): 36.7 (10-15-19 @ 04:37), Max: 36.8 (10-14-19 @ 21:13)  HR: 80 (10-15-19 @ 04:37) (71 - 80)  BP: 159/73 (10-15-19 @ 04:37) (152/78 - 175/80)  RR: 18 (10-15-19 @ 04:37) (18 - 18)  SpO2: 96% (10-15-19 @ 04:37) (95% - 98%)  Wt(kg): --  I&O's Summary    14 Oct 2019 07:01  -  15 Oct 2019 07:00  --------------------------------------------------------  IN: 510 mL / OUT: 1550 mL / NET: -1040 mL        Appearance:  	  HEENT:   Normal oral mucosa, PERRL, EOMI; + post surgical changes on the frontal region of the and + L frontal hematoma   Carotid: UNABLE TO ASSESS DUE TO C- COLLAR IN PLACE   Lymphatic: No lymphadenopathy  Cardiovascular:  + s1 s2 RRR  Respiratory:  CTA b/l no wheezing rhonchi or rales   Psychiatry:  AAO x 3  Gastrointestinal:  Soft, Non-tender, + BS	  Skin: No rashes, No ecchymoses, No cyanosis	  Neurologic:  no focal neurologic deficits   Extremities:  no edema cyanosis or clubbing      Foot Exam:        LABS:	 	    CBC Full  -  ( 15 Oct 2019 07:12 )  WBC Count : 11.65 K/uL  Hemoglobin : 12.7 g/dL  Hematocrit : 38.9 %  Platelet Count - Automated : 249 K/uL  Mean Cell Volume : 93.3 fl  Mean Cell Hemoglobin : 30.5 pg  Mean Cell Hemoglobin Concentration : 32.6 gm/dL  Auto Neutrophil # : x  Auto Lymphocyte # : x  Auto Monocyte # : x  Auto Eosinophil # : x  Auto Basophil # : x  Auto Neutrophil % : x  Auto Lymphocyte % : x  Auto Monocyte % : x  Auto Eosinophil % : x  Auto Basophil % : x    10-15    139  |  105  |  8   ----------------------------<  98  3.9   |  23  |  0.60  10-14    139  |  104  |  8   ----------------------------<  122<H>  3.9   |  26  |  0.62    Ca    8.7      15 Oct 2019 07:11  Ca    8.9      14 Oct 2019 06:55  Phos  2.2     10-14  Mg     2.1     10-14    CTA of the neck:  Left common carotid artery demonstrates progressive narrowing to   occlusion beginning at its origin, with reconstitution at its   bifurcation. Findings are likely chronic in nature.    Left internal carotid artery demonstrates progressive narrowing beginning   at its margin, and then long segment moderate to severe stenosis   throughout its course in the neck        Assessment:  82 y/o Female PMH ? 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  - MRA of the cervical spine: decreased size in caliber of the left common and  left internal carotid artery segment, as noted on CTA,. Dominant patent  right internal carotid artery,      Plan:  - recommend US duplex of the carotid arteries (when feasible as patient has a c-collar on)   - if not contraindicated from a Neurosurgical stand point start ASA 81mg po q daily and obtain Lipid Panel and start high intensity statin (Crestor 20mg po q daily / Lipitor 40mg )    - recommend optimal blood pressure management and control   - no acute endovascular intervention of the L ICA at this time due to acute ongoing medical conditions; recommend follow up in the outpatient setting   - Neurosurgery input appreciated     Thank you  Salima Beck D.O.     Vascular Cardiology Service    Please call with any questions:   SPECTRA - [id number]  Office [phone]  email:  [email or web address]

## 2019-10-15 NOTE — PROGRESS NOTE ADULT - SUBJECTIVE AND OBJECTIVE BOX
Patient is a 83y old  Female who presents with a chief complaint of cervical spine fracture (13 Oct 2019 04:38)      SUBJECTIVE / OVERNIGHT EVENTS:  No events overnight. Patient anxious today, but no complaints. Pain is improving in cervical spine.     ROS:  All other review of systems negative    Allergies    No Known Allergies    Intolerances        MEDICATIONS  (STANDING):  levothyroxine 50 MICROGram(s) Oral daily  simvastatin 10 milliGRAM(s) Oral at bedtime    MEDICATIONS  (PRN):      Vital Signs Last 24 Hrs  T(C): 36.7 (15 Oct 2019 12:13), Max: 36.8 (14 Oct 2019 21:13)  T(F): 98 (15 Oct 2019 12:13), Max: 98.3 (14 Oct 2019 21:13)  HR: 79 (15 Oct 2019 12:13) (71 - 80)  BP: 168/80 (15 Oct 2019 12:13) (152/78 - 168/80)  BP(mean): --  RR: 18 (15 Oct 2019 12:13) (18 - 18)  SpO2: 97% (15 Oct 2019 12:13) (96% - 98%)      I&O's Summary    14 Oct 2019 07:01  -  15 Oct 2019 07:00  --------------------------------------------------------  IN: 510 mL / OUT: 1550 mL / NET: -1040 mL    15 Oct 2019 07:01  -  15 Oct 2019 16:20  --------------------------------------------------------  IN: 480 mL / OUT: 200 mL / NET: 280 mL            PHYSICAL EXAM:  GENERA: NAD  HEAD:  Normocephalic, Right forehead hematoma  EYES: EOMI, PERRLA, conjunctiva and sclera clear  NECK: Cervical Collar in place  CHEST/LUNG: Clear to auscultation bilaterally; No wheeze  HEART: Regular rate and rhythm; No murmurs, rubs, or gallops  ABDOMEN: Soft, Nontender, Nondistended; Bowel sounds present  EXTREMITIES:  Left arm in splint with swelling and discoloration of digits.   NEUROLOGY: AAOx3, 5/5 UE and LE strength.   PSYCH: calm  SKIN: No rashes or lesions  Tele: No events.   LABS:                                              12.7   11.65 )-----------( 249      ( 15 Oct 2019 07:12 )             38.9   10-15    139  |  105  |  8   ----------------------------<  98  3.9   |  23  |  0.60    Ca    8.7      15 Oct 2019 07:11  Phos  2.2     10-14  Mg     2.1     10-14        Color: Yellow / Appearance: Clear / S.023 / pH: x  Gluc: x / Ketone: Negative  / Bili: Negative / Urobili: Negative   Blood: x / Protein: Trace / Nitrite: Negative   Leuk Esterase: Negative / RBC: 3 /hpf / WBC 1 /HPF   Sq Epi: x / Non Sq Epi: 1 /hpf / Bacteria: Negative        RADIOLOGY & ADDITIONAL TESTS:    Imaging Personally Reviewed:    Consultant(s) Notes Reviewed:      Care Discussed with Consultants/Other Providers: Vascular cardiology attending,  neurosurgery resident, hand surgrey attending.     Case Discussed with Family:    Goals of Care:

## 2019-10-15 NOTE — CONSULT NOTE ADULT - ATTENDING COMMENTS
Neurosurgery to complete workup   No acute intervention needed for the carotid artery at this point.   Would recommend a carotid duplex to assess in greater detail once C-collar can be removed.  Antiplatelet and statin therapy     Yashira [id number] Neurosurgery to complete workup / Comment on C-spine    No acute intervention needed for the carotid artery at this point.   Would recommend a carotid duplex to assess in greater detail once C-collar can be removed.  Would pursue MRA to better assess for vertebral dissection  if disseciton is present, then will need DAPT    In the meantime, if ok with NSx would start antiplatelet and statin therapy     Yashira [id number]

## 2019-10-15 NOTE — PROGRESS NOTE ADULT - PROBLEM SELECTOR PLAN 3
discussed personally with hand surgeon.  -this is an unstable fx, however no acuity to place pins.  -will see patient, but we will stabilize patient from other medical issues and hten will address need for surgery

## 2019-10-15 NOTE — PROGRESS NOTE ADULT - PROBLEM SELECTOR PLAN 2
TTE negative, no events on tele, no acute CVA.   -unclear etiology, but workup negative so far, no need for further inpatient workup.   -orhtostatics when able to get up

## 2019-10-15 NOTE — PROGRESS NOTE ADULT - PROBLEM SELECTOR PLAN 1
-CT imaging reveals non-displaced C2 fracture through the left C2 pars into the transverse foramen  -Continue Cervical Collar  -discussed with neurosurgery reisdent who states patient "cleared" from their standpoint without need for MR, but wanted to continue cervical collar.   -MR reviewed, has long segment stenosis, no interveniton at this time given no acute CVA  -however showed possible vert a. disseciton, will obtain MRA to assess

## 2019-10-15 NOTE — PROGRESS NOTE ADULT - PROBLEM SELECTOR PLAN 4
no further events but given aphasia and fall with severe carotid stenosis on L., MR brain is appropriate.

## 2019-10-16 LAB
HCT VFR BLD CALC: 39.2 % — SIGNIFICANT CHANGE UP (ref 34.5–45)
HGB BLD-MCNC: 12.9 G/DL — SIGNIFICANT CHANGE UP (ref 11.5–15.5)
MCHC RBC-ENTMCNC: 30.7 PG — SIGNIFICANT CHANGE UP (ref 27–34)
MCHC RBC-ENTMCNC: 32.9 GM/DL — SIGNIFICANT CHANGE UP (ref 32–36)
MCV RBC AUTO: 93.3 FL — SIGNIFICANT CHANGE UP (ref 80–100)
NRBC # BLD: 0 /100 WBCS — SIGNIFICANT CHANGE UP (ref 0–0)
PLATELET # BLD AUTO: 240 K/UL — SIGNIFICANT CHANGE UP (ref 150–400)
RBC # BLD: 4.2 M/UL — SIGNIFICANT CHANGE UP (ref 3.8–5.2)
RBC # FLD: 13.1 % — SIGNIFICANT CHANGE UP (ref 10.3–14.5)
WBC # BLD: 12.13 K/UL — HIGH (ref 3.8–10.5)
WBC # FLD AUTO: 12.13 K/UL — HIGH (ref 3.8–10.5)

## 2019-10-16 PROCEDURE — 99233 SBSQ HOSP IP/OBS HIGH 50: CPT

## 2019-10-16 RX ORDER — ACETAMINOPHEN 500 MG
1000 TABLET ORAL ONCE
Refills: 0 | Status: COMPLETED | OUTPATIENT
Start: 2019-10-16 | End: 2019-10-16

## 2019-10-16 RX ADMIN — Medication 1000 MILLIGRAM(S): at 14:12

## 2019-10-16 RX ADMIN — Medication 650 MILLIGRAM(S): at 23:10

## 2019-10-16 RX ADMIN — Medication 650 MILLIGRAM(S): at 10:18

## 2019-10-16 RX ADMIN — Medication 1 APPLICATION(S): at 05:19

## 2019-10-16 RX ADMIN — SIMVASTATIN 10 MILLIGRAM(S): 20 TABLET, FILM COATED ORAL at 21:33

## 2019-10-16 RX ADMIN — Medication 650 MILLIGRAM(S): at 21:33

## 2019-10-16 RX ADMIN — Medication 400 MILLIGRAM(S): at 13:54

## 2019-10-16 RX ADMIN — Medication 650 MILLIGRAM(S): at 11:50

## 2019-10-16 RX ADMIN — Medication 50 MICROGRAM(S): at 05:19

## 2019-10-16 RX ADMIN — Medication 1 APPLICATION(S): at 16:52

## 2019-10-16 NOTE — SPEECH LANGUAGE PATHOLOGY EVALUATION - SLP DIAGNOSIS
Pt presents with anomic aphasia with characterized by word finding difficulty and receptive language deficits noted with increased complexity of stimuli. Pt presents with anomic aphasia with characterized by word finding difficulty, circumlocutions, perseveration and receptive language deficits noted with increased complexity of stimuli. Pt presents with anomic aphasia characterized by word finding difficulty, circumlocutions, perseveration and receptive language deficits noted with increased complexity of stimuli.

## 2019-10-16 NOTE — SPEECH LANGUAGE PATHOLOGY EVALUATION - SPECIFY REASON(S)
To assess oropharyngeal swallow function; r/o dysphagia To assess speech-language and cognitive deficits

## 2019-10-16 NOTE — SPEECH LANGUAGE PATHOLOGY EVALUATION - SLP PERTINENT HISTORY OF CURRENT PROBLEM
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 Neurosurgery consulted for non-displaced C2 fracture through the left C2 pars into the transverse foramen, patient currently on [place] J c-collar. Neurosurg consulted: no surgical intervention. CTA of the neck on 10/13: Left common carotid artery demonstrates progressive narrowing to  occlusion beginning at its origin, with reconstitution at its bifurcation. MRA of the cervical spine (10/14) revealed decreased size in caliber of the left common and  left internal carotid artery segment, as noted on CTA,. Dominant patent right internal carotid artery. TTE performed to identify structural heart disease; results normal.

## 2019-10-16 NOTE — SPEECH LANGUAGE PATHOLOGY EVALUATION - RESPONSIVE NAMING
+word finding deficits, +increased processing time. Pt benefited from gestures and being provided cloze procedure./impaired

## 2019-10-16 NOTE — SWALLOW BEDSIDE ASSESSMENT ADULT - SLP PERTINENT HISTORY OF CURRENT PROBLEM
84 y/o Female hx Stroke (?residual aphasia) on ASA&plavix, carotid stenosis, HTN, HLD, Hypothyroidism, Brain aneurysm s/p clipping, dementia, presenting to the ED BIBEMS after witnessed fall in setting of possible vaso-vagal response found to have acute fracture of cervical spine & 1st metacarpal left hand. Neurosurgery consulted for non-displaced C2 fracture through the left C2 pars into the transverse foramen, patient currently on [place] J c-collar. CTA head and neck preformed for vasculature integrity given new cervical fracture pending official read. MR c-spine ordered. Rec to obtain TTE to identify structural heart disease. CT head negative for acute infarct.  Recommend MRI brain. Pt had SPICA splint placed in ER for metacarpal bone fracture. Aphasic at baseline. CT brain w/ old Left frontoparietal encephalomalacia/gliosis and ex vacuo dilatation of the left lateral ventricle. Chronic lacunar infarct in the right centrum which may be contributory. 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 Neurosurgery consulted for non-displaced C2 fracture through the left C2 pars into the transverse foramen, patient currently on [place] J c-collar. CTA head and neck preformed for vasculature integrity given new cervical fracture pending official read. MR c-spine ordered. Rec to obtain TTE to identify structural heart disease. CT head negative for acute infarct.  Recommend MRI brain. Pt had SPICA splint placed in ER for metacarpal bone fracture. Aphasic at baseline. CT brain w/ old Left frontoparietal encephalomalacia/gliosis and ex vacuo dilatation of the left lateral ventricle. Chronic lacunar infarct in the right centrum which may be contributory. 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 Neurosurgery consulted for non-displaced C2 fracture through the left C2 pars into the transverse foramen, patient currently on [place] J c-collar. Neurosurg consulted: no surgical intervention. CTA of the neck on 10/13: Left common carotid artery demonstrates progressive narrowing to  occlusion beginning at its origin, with reconstitution at its bifurcation. MRA of the cervical spine (10/14) revealed decreased size in caliber of the left common and  left internal carotid artery segment, as noted on CTA,. Dominant patent right internal carotid artery. TTE performed to identify structural heart disease; results normal.

## 2019-10-16 NOTE — SPEECH LANGUAGE PATHOLOGY EVALUATION - DESCRIBE:
Pt able to answer ego centric yes/no questions + simple egocentric yes/no questions Pt responded to simple egocentric yes/no questions

## 2019-10-16 NOTE — SPEECH LANGUAGE PATHOLOGY EVALUATION - SLP PATIENT/FAMILY GOALS STATEMENT
Pt reported language deficits post old CVA ~40 years ago. She does not remember if she received previous speech-language services. Pt reported language deficits post old CVA ~40 years ago. As per ED note, family reported language and mentation is at baseline as a result of prior CVA. Pt does not remember if she received previous speech-language services.

## 2019-10-16 NOTE — SPEECH LANGUAGE PATHOLOGY EVALUATION - CONFRONTATIONAL NAMING
impaired/+word finding deficits, +increased processing time; pt benefited from semantic cues impaired/+ anomia, + increased processing time; pt benefited from semantic cues

## 2019-10-16 NOTE — PROGRESS NOTE ADULT - SUBJECTIVE AND OBJECTIVE BOX
Patient is a 83y old  Female who presents with a chief complaint of cervical spine fracture (13 Oct 2019 04:38)      SUBJECTIVE / OVERNIGHT EVENTS:  No events overnight. Patient anxious today, but no complaints. Pain is improving in cervical spine.     ROS:  All other review of systems negative    Allergies    No Known Allergies    Intolerances        MEDICATIONS  (STANDING):  levothyroxine 50 MICROGram(s) Oral daily  simvastatin 10 milliGRAM(s) Oral at bedtime    MEDICATIONS  (PRN):      Vital Signs Last 24 Hrs  T(C): 36.9 (16 Oct 2019 12:25), Max: 36.9 (16 Oct 2019 12:25)  T(F): 98.4 (16 Oct 2019 12:25), Max: 98.4 (16 Oct 2019 12:25)  HR: 77 (16 Oct 2019 12:25) (68 - 77)  BP: 163/90 (16 Oct 2019 12:25) (124/67 - 163/90)  BP(mean): --  RR: 17 (16 Oct 2019 12:25) (17 - 18)  SpO2: 97% (16 Oct 2019 12:25) (96% - 97%)    I&O's Summary    14 Oct 2019 07:01  -  15 Oct 2019 07:00  --------------------------------------------------------  IN: 510 mL / OUT: 1550 mL / NET: -1040 mL    15 Oct 2019 07:01  -  15 Oct 2019 16:20  --------------------------------------------------------  IN: 480 mL / OUT: 200 mL / NET: 280 mL            PHYSICAL EXAM:  GENERA: NAD  HEAD:  Normocephalic, Right forehead hematoma  EYES: EOMI, PERRLA, conjunctiva and sclera clear  NECK: Cervical Collar in place  CHEST/LUNG: Clear to auscultation bilaterally; No wheeze  HEART: Regular rate and rhythm; No murmurs, rubs, or gallops  ABDOMEN: Soft, Nontender, Nondistended; Bowel sounds present  EXTREMITIES:  Left arm in splint with swelling and discoloration of digits.   NEUROLOGY: AAOx3, 5/5 UE and LE strength.   PSYCH: calm  SKIN: No rashes or lesions  Tele: No events.   LABS:                                                                 12.9   12.13 )-----------( 240      ( 16 Oct 2019 06:35 )             39.2   10-15    139  |  105  |  8   ----------------------------<  98  3.9   |  23  |  0.60    Ca    8.7      15 Oct 2019 07:11        Color: Yellow / Appearance: Clear / S.023 / pH: x  Gluc: x / Ketone: Negative  / Bili: Negative / Urobili: Negative   Blood: x / Protein: Trace / Nitrite: Negative   Leuk Esterase: Negative / RBC: 3 /hpf / WBC 1 /HPF   Sq Epi: x / Non Sq Epi: 1 /hpf / Bacteria: Negative        RADIOLOGY & ADDITIONAL TESTS:  < from: MR Angio Head w/wo IV Cont (10.15.19 @ 21:01) >  MRA NECK:    Axial fat-saturated T1-weighted images demonstrate crescentic high signal   at the level of the left C2 foramina transversarium, however, its flow   void is not narrowed, and the vessel is well seen on the prior CTA at   this level. Arterial dissection is not strongly suspected, nor is it   entirely excluded.    Redemonstration of chronic occlusion of the left common carotid artery   beginning just distal to its origin, with reconstitution at its   bifurcation.    Redemonstration of narrowing of the left internal carotid artery   beginning at its origin, and progressing to long segment moderate to   severe stenosis is coarse throughout the neck.    MRA BRAIN:    Moderate to severe long segment stenosis of the left skull base and   supraclinoid ICA. The left MCA is of normal caliber, but enhances to a   lesser degree than the right MCA.    9 x 4 mm focus of enhancement along the lateral wall of the previously   seen peripherally calcified left suprasellar/paraclinoid lesion,   previously thought to represent a thrombosed left ICA aneurysm. This   corresponds to a region of ill-defined enhancement seen on the prior CTA   and may be within or adjacent to the aneurysm.        < end of copied text >    Imaging Personally Reviewed:    Consultant(s) Notes Reviewed:      Care Discussed with Consultants/Other Providers: Vascular cardiology attending,  neurosurgery resident, hand surgrey attending.     Case Discussed with Family:    Goals of Care:

## 2019-10-16 NOTE — PROGRESS NOTE ADULT - PROBLEM SELECTOR PLAN 2
TTE negative, no events on tele, no acute CVA.   -unclear etiology, but workup negative so far, no need for further inpatient workup.   -orhtostatics today.

## 2019-10-16 NOTE — SPEECH LANGUAGE PATHOLOGY EVALUATION - SLP GENERAL OBSERVATIONS
Pt encountered alert and oriented to person, upright in bed. + cervical collar in place. Pt required repetition and verbal cues to follow 1-step commands for oral motor exam. + word finding difficulties. Pt demonstrated preference to writing in response to questions. Pt may benefit from white board to express needs and wants. Pt encountered alert and oriented to person and place, upright in bed. + cervical collar in place. Pt required repetition and verbal cues to follow 1-step commands for oral motor exam. + word finding difficulties. Pt demonstrated preference to writing in response to questions. Pt may benefit from white board to express needs and wants. Pt encountered alert and oriented to person and place, upright in bed. + cervical collar in place. Pt required repetition and verbal cues to follow 1-step commands for oral motor exam. + word finding difficulties. Pt demonstrated preference for writing responses. May benefit from white board to express needs and wants.

## 2019-10-16 NOTE — SPEECH LANGUAGE PATHOLOGY EVALUATION - YES/NO QUESTIONS: COMPLEX
no/Pt demonstrated difficulty answering yes/no questions pertaining to complex ideational material no/+ difficulty answering complex ideational material

## 2019-10-16 NOTE — SWALLOW BEDSIDE ASSESSMENT ADULT - SLP GENERAL OBSERVATIONS
Pt encountered alert and oriented x1, upright in bed. Pt required repetition and verbal cues to follow 1-step commands for oral motor exam. Pt encountered alert and oriented to person, upright in bed. + cervical collar in place. Pt required repetition and verbal cues to follow 1-step commands for oral motor exam. + word finding difficulties. Pt demonstrated preference to writing in response to questions. Pt may benefit from white board to express needs and wants.

## 2019-10-16 NOTE — SPEECH LANGUAGE PATHOLOGY EVALUATION - SLP PRECAUTIONS/LIMITATIONS: VISION
Partially impaired; pt with glasses/impaired Partially impaired; pt reported she wears glasses occasionally.

## 2019-10-16 NOTE — PROGRESS NOTE ADULT - PROBLEM SELECTOR PLAN 1
-CT imaging reveals non-displaced C2 fracture through the left C2 pars into the transverse foramen  -Continue Cervical Collar  -discussed with neurosurgery reisdent who states patient "cleared" from their standpoint without need for MR, but wanted to continue cervical collar.   -MR reviewed, has long segment stenosis, no interveniton at this time given no acute CVA  -MRA with dissection of vert. artery is "not likely, though not entirely excluded".  -follow up with vascular cards.

## 2019-10-16 NOTE — SWALLOW BEDSIDE ASSESSMENT ADULT - SWALLOW EVAL: DIAGNOSIS
Pt presents 1) adequate oropharyngeal swallow function. Presence of cervical collar impeding mastication. Pt benefited from liquid wash to clear oral residue. Difficult to assess pharyngeal stage upon palpation due to presence of cervical collar. Pt with 1x throat clear with thin liquids via straw. No overt, clinical s/s of aspiration/penetration with additional trials of thin via straw, single cup sips, and mechanical soft textures. 2) anomic aphasia characterized by word finding difficulties. Receptive language deficits noted with increased complexity of stimuli. See formal speech/language evaluation for details.

## 2019-10-16 NOTE — SPEECH LANGUAGE PATHOLOGY EVALUATION - COMMENTS
Pt presented word finding difficulty answering biographical questions. She was unable to produce her address and the year she was born. Pt preferred to write responses; therefore, she would benefit from white board in her room. Pt's word finding deficits impacted ability to answer biographical questions. With increased processing time, she produced her house number but was unable to produce her street name and stated, "I know what is is, but I can't now."  She stated her birth month and date but was unable to produce the year stating, "It is hard for me." Pt preferred to write responses; therefore, she would benefit from white board in her room.

## 2019-10-16 NOTE — SPEECH LANGUAGE PATHOLOGY EVALUATION - SLP AUTOMATIC SPEECH
counting/Pt counted 1-10. Pt required verbal cues to initiate task when prompted to produce LAISHA. Pt counted 1-10. Required verbal cues to initiate task when prompted to produce LAISHA.

## 2019-10-16 NOTE — SWALLOW BEDSIDE ASSESSMENT ADULT - COMMENTS
Given new fall w/ hematoma for DVT ppx will start b/l SCDs. Keep NPO w/ meds for now pending S&S evaluation given c2 fracture with fall/aspiration precautions. Chest x-ray 10/12: Hiatal hernia. No focal opacity. No pleural effusion or pneumothorax. The heart is enlarged. No displaced rib fracture. Plastic surgery consulted on 10/14; pt chose nonoperative management. PT evaluated; pending results. Neuro surg note from 10/14 stating CTA negative, no acute neurosurgical intervention. Keep C-collar. Per hospitalist on 10/14, given vascular carotid stenosis and possible need for intubation, will continue with MR cervical spine to look for ligamental damage and see if can remove C-collar, TTE normal, but given facial trauma, thumb trauma, unclear whether ?TIA, synocpe, rec MRI. Vascular cardiology consulted for L carotid stenosis; - CTA of the neck Left common carotid artery demonstrates progressive narrowing to  occlusion beginning at its origin, with reconstitution at its bifurcation. MRA of the cervical spine (10/14) revealed decreased size in caliber of the left common and  left internal carotid artery segment, as noted on CTA,. Dominant patent  right internal carotid artery; recommended US duplex of the carotid arteries (when feasible as patient has a c-collar on) and if not contraindicated from a Neurosurgical stand point start ASA 81mg po q daily and obtain. MR Brain 10/14: Right frontal scalp hematoma. Redemonstration of left frontotemporal craniotomy, left frontal temporal and parietal encephalomalacia, lateral ventricle ex vacuo enlargement, partially thrombosed left paraclinoid medially projecting 1.2 x 1.1 cm aneurysm. SLE and BSE ordered. Pt was placed on mechanical soft texture diet. CT head negative for acute infarct. Aphasic at baseline. CT brain w/ old Left frontoparietal encephalomalacia/gliosis and ex vacuo dilatation of the left lateral ventricle. Chronic lacunar infarct in the right centrum which may be contributory. Team recommended keep NPO w/ meds pending S&S evaluation given c2 fracture with fall/aspiration precautions. Chest x-ray 10/12: Hiatal hernia. No focal opacity. No pleural effusion or pneumothorax. The heart is enlarged. No displaced rib fracture. Vascular cardiology consulted for L carotid stenosis: pending US duplex of the carotid arteries. MR Brain 10/14: Right frontal scalp hematoma. Redemonstration of left frontotemporal craniotomy, left frontal temporal and parietal encephalomalacia, lateral ventricle ex vacuo enlargement, partially thrombosed left paraclinoid medially projecting 1.2 x 1.1 cm aneurysm. SLE and BSE ordered. Pt was placed on mechanical soft texture diet on 10/14.

## 2019-10-16 NOTE — PROGRESS NOTE ADULT - SUBJECTIVE AND OBJECTIVE BOX
Vascular Cardiology  Progress note     [place] [id number]              EMAIL [email or web address]   OFFICE [phone]    CC:      INTERVAL HISTORY:           Allergies    No Known Allergies    Intolerances    	    MEDICATIONS:        acetaminophen   Tablet .. 650 milliGRAM(s) Oral every 6 hours PRN      levothyroxine 50 MICROGram(s) Oral daily  simvastatin 10 milliGRAM(s) Oral at bedtime    BACItracin   Ointment 1 Application(s) Topical two times a day      PAST MEDICAL & SURGICAL HISTORY:  Carotid stenosis  Hypothyroidism  Hyperlipidemia  Hypertension  Stroke: no residual deficits  H/O abdominal hysterectomy  Brain aneurysm: s/p repair  H/O total knee replacement  History of craniotomy      FAMILY HISTORY:  No pertinent family history: stroke      SOCIAL HISTORY:  unchanged    REVIEW OF SYSTEMS:  CONSTITUTIONAL: No fever, weight loss, or fatigue  EYES: No eye pain, visual disturbances, or discharge  ENMT:  No difficulty hearing, tinnitus, vertigo; No sinus or throat pain  NECK: No pain or stiffness  RESPIRATORY: No cough, wheezing, chills or hemoptysis; No Shortness of Breath  CARDIOVASCULAR: No chest pain, palpitations, passing out, dizziness, or leg swelling  GASTROINTESTINAL: No abdominal or epigastric pain. No nausea, vomiting, or hematemesis; No diarrhea or constipation. No melena or hematochezia.  GENITOURINARY: No dysuria, frequency, hematuria, or incontinence  NEUROLOGICAL: No headaches, memory loss, loss of strength, numbness, or tremors  SKIN: No itching, burning, rashes, or lesions   LYMPH Nodes: No enlarged glands  ENDOCRINE: No heat or cold intolerance; No hair loss  MUSCULOSKELETAL: No joint pain or swelling; No muscle, back, or extremity pain  PSYCHIATRIC: No depression, anxiety, mood swings, or difficulty sleeping  HEME/LYMPH: No easy bruising, or bleeding gums  ALLERY AND IMMUNOLOGIC: No hives or eczema	    [ x] All others negative	  [ ] Unable to obtain    PHYSICAL EXAM:  T(C): 36.7 (10-16-19 @ 04:49), Max: 36.7 (10-15-19 @ 12:13)  HR: 68 (10-16-19 @ 10:40) (68 - 79)  BP: 156/74 (10-16-19 @ 10:40) (124/67 - 168/80)  RR: 18 (10-16-19 @ 10:40) (18 - 18)  SpO2: 97% (10-16-19 @ 10:40) (96% - 97%)  Wt(kg): --  I&O's Summary    15 Oct 2019 07:01  -  16 Oct 2019 07:00  --------------------------------------------------------  IN: 530 mL / OUT: 200 mL / NET: 330 mL    16 Oct 2019 07:01  -  16 Oct 2019 11:15  --------------------------------------------------------  IN: 0 mL / OUT: 100 mL / NET: -100 mL        Appearance:  	  HEENT:   Normal oral mucosa, PERRL, EOMI; + post surgical changes on the frontal region of the and + L frontal hematoma   Carotid: UNABLE TO ASSESS DUE TO C- COLLAR IN PLACE   Lymphatic: No lymphadenopathy  Cardiovascular:  + s1 s2 RRR  Respiratory:  CTA b/l no wheezing rhonchi or rales   Psychiatry:  AAO x 3  Gastrointestinal:  Soft, Non-tender, + BS	  Skin: No rashes, No ecchymoses, No cyanosis	  Neurologic:  no focal neurologic deficits   Extremities:  no edema cyanosis or clubbing       LABS:	 	    CBC Full  -  ( 16 Oct 2019 06:35 )  WBC Count : 12.13 K/uL  Hemoglobin : 12.9 g/dL  Hematocrit : 39.2 %  Platelet Count - Automated : 240 K/uL  Mean Cell Volume : 93.3 fl  Mean Cell Hemoglobin : 30.7 pg  Mean Cell Hemoglobin Concentration : 32.9 gm/dL  Auto Neutrophil # : x  Auto Lymphocyte # : x  Auto Monocyte # : x  Auto Eosinophil # : x  Auto Basophil # : x  Auto Neutrophil % : x  Auto Lymphocyte % : x  Auto Monocyte % : x  Auto Eosinophil % : x  Auto Basophil % : x    10-15    139  |  105  |  8   ----------------------------<  98  3.9   |  23  |  0.60    Ca    8.7      15 Oct 2019 07:11            Assessment:  1.            Plan:  1.          Thank you      Vascular Cardiology Service   [facility] [address]  Office [phone]  email:   [email or web address]  Progress note     [place] [id number]              EMAIL [email or web address]   OFFICE [phone]    CC:   L common carotid stenosis and L internal carotid artery stenosis    INTERVAL HISTORY:  Patient is hemodynamically stable with no complaints of chest pain or shortness of breath   C-collar in place   MRA of the cervical spine shows     Allergies  No Known Allergies  Intolerances      MEDICATIONS:  acetaminophen   Tablet .. 650 milliGRAM(s) Oral every 6 hours PRN  levothyroxine 50 MICROGram(s) Oral daily  simvastatin 10 milliGRAM(s) Oral at bedtime  BACItracin   Ointment 1 Application(s) Topical two times a day      PAST MEDICAL & SURGICAL HISTORY:  Carotid stenosis  Hypothyroidism  Hyperlipidemia  Hypertension  Stroke: no residual deficits  H/O abdominal hysterectomy  Brain aneurysm: s/p repair  H/O total knee replacement  History of craniotomy      FAMILY HISTORY:  No pertinent family history: stroke      SOCIAL HISTORY:  unchanged    REVIEW OF SYSTEMS:  CONSTITUTIONAL: No fever, weight loss, or fatigue  EYES: No eye pain, visual disturbances, or discharge  ENMT:  No difficulty hearing, tinnitus, vertigo; No sinus or throat pain  NECK: No pain or stiffness  RESPIRATORY: No cough, wheezing, chills or hemoptysis; No Shortness of Breath  CARDIOVASCULAR: No chest pain, palpitations, passing out, dizziness, or leg swelling  GASTROINTESTINAL: No abdominal or epigastric pain. No nausea, vomiting, or hematemesis; No diarrhea or constipation. No melena or hematochezia.  GENITOURINARY: No dysuria, frequency, hematuria, or incontinence  NEUROLOGICAL: No headaches, memory loss, loss of strength, numbness, or tremors  SKIN: No itching, burning, rashes, or lesions   LYMPH Nodes: No enlarged glands  ENDOCRINE: No heat or cold intolerance; No hair loss  MUSCULOSKELETAL: No joint pain or swelling; No muscle, back, or extremity pain  PSYCHIATRIC: No depression, anxiety, mood swings, or difficulty sleeping  HEME/LYMPH: No easy bruising, or bleeding gums  ALLERY AND IMMUNOLOGIC: No hives or eczema	    [ x] All others negative	  [ ] Unable to obtain    PHYSICAL EXAM:  T(C): 36.7 (10-16-19 @ 04:49), Max: 36.7 (10-15-19 @ 12:13)  HR: 68 (10-16-19 @ 10:40) (68 - 79)  BP: 156/74 (10-16-19 @ 10:40) (124/67 - 168/80)  RR: 18 (10-16-19 @ 10:40) (18 - 18)  SpO2: 97% (10-16-19 @ 10:40) (96% - 97%)  Wt(kg): --  I&O's Summary    15 Oct 2019 07:01  -  16 Oct 2019 07:00  --------------------------------------------------------  IN: 530 mL / OUT: 200 mL / NET: 330 mL    16 Oct 2019 07:01  -  16 Oct 2019 11:15  --------------------------------------------------------  IN: 0 mL / OUT: 100 mL / NET: -100 mL        Appearance:  	  HEENT:   Normal oral mucosa, PERRL, EOMI; + post surgical changes on the frontal region of the and + L frontal hematoma   Carotid: UNABLE TO ASSESS DUE TO C- COLLAR IN PLACE   Lymphatic: No lymphadenopathy  Cardiovascular:  + s1 s2 RRR  Respiratory:  CTA b/l no wheezing rhonchi or rales   Psychiatry:  AAO x 3  Gastrointestinal:  Soft, Non-tender, + BS	  Skin: No rashes, No ecchymoses, No cyanosis	  Neurologic:  no focal neurologic deficits   Extremities:  no edema cyanosis or clubbing       LABS:	 	    CBC Full  -  ( 16 Oct 2019 06:35 )  WBC Count : 12.13 K/uL  Hemoglobin : 12.9 g/dL  Hematocrit : 39.2 %  Platelet Count - Automated : 240 K/uL  Mean Cell Volume : 93.3 fl  Mean Cell Hemoglobin : 30.7 pg  Mean Cell Hemoglobin Concentration : 32.9 gm/dL  Auto Neutrophil # : x  Auto Lymphocyte # : x  Auto Monocyte # : x  Auto Eosinophil # : x  Auto Basophil # : x  Auto Neutrophil % : x  Auto Lymphocyte % : x  Auto Monocyte % : x  Auto Eosinophil % : x  Auto Basophil % : x    10-15    139  |  105  |  8   ----------------------------<  98  3.9   |  23  |  0.60    Ca    8.7      15 Oct 2019 07:11            Assessment:  84 y/o Female PMH ? 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  - MR of the cervical spine: decreased size in caliber of the left common and  left internal carotid artery segment, as noted on CTA,. Dominant patent  right internal carotid artery,    2. ? Dissection of the R vertebral artery   - MRA of the cervical spine without fat saturation done     Plan:  - recommend US duplex of the carotid arteries (when feasible as patient has a c-collar on)   - if not contraindicated from a Neurosurgical stand point start ASA 81mg po q daily and obtain Lipid Panel and start high intensity statin (Crestor 20mg po q daily / Lipitor 40mg )    - recommend optimal blood pressure management and control   - no acute endovascular intervention of the L ICA at this time due to acute ongoing medical conditions; recommend follow up in the outpatient setting   - Neurosurgery input appreciated   - based on #2     Thank you  Salima Beck D.O.     Vascular Cardiology Service   [facility] [address]  Office [phone]  email:   [email or web address]  Progress note     [place] [id number]              EMAIL [email or web address]   OFFICE [phone]    CC:   L common carotid stenosis and L internal carotid artery stenosis    INTERVAL HISTORY:  Patient is hemodynamically stable with no complaints of chest pain or shortness of breath   C-collar in place     Allergies  No Known Allergies  Intolerances      MEDICATIONS:  acetaminophen   Tablet .. 650 milliGRAM(s) Oral every 6 hours PRN  levothyroxine 50 MICROGram(s) Oral daily  simvastatin 10 milliGRAM(s) Oral at bedtime  BACItracin   Ointment 1 Application(s) Topical two times a day      PAST MEDICAL & SURGICAL HISTORY:  Carotid stenosis  Hypothyroidism  Hyperlipidemia  Hypertension  Stroke: no residual deficits  H/O abdominal hysterectomy  Brain aneurysm: s/p repair  H/O total knee replacement  History of craniotomy      FAMILY HISTORY:  No pertinent family history: stroke      SOCIAL HISTORY:  unchanged    REVIEW OF SYSTEMS:  CONSTITUTIONAL: No fever, weight loss, or fatigue  EYES: No eye pain, visual disturbances, or discharge  ENMT:  No difficulty hearing, tinnitus, vertigo; No sinus or throat pain  NECK: No pain or stiffness  RESPIRATORY: No cough, wheezing, chills or hemoptysis; No Shortness of Breath  CARDIOVASCULAR: No chest pain, palpitations, passing out, dizziness, or leg swelling  GASTROINTESTINAL: No abdominal or epigastric pain. No nausea, vomiting, or hematemesis; No diarrhea or constipation. No melena or hematochezia.  GENITOURINARY: No dysuria, frequency, hematuria, or incontinence  NEUROLOGICAL: No headaches, memory loss, loss of strength, numbness, or tremors  SKIN: No itching, burning, rashes, or lesions   LYMPH Nodes: No enlarged glands  ENDOCRINE: No heat or cold intolerance; No hair loss  MUSCULOSKELETAL: No joint pain or swelling; No muscle, back, or extremity pain  PSYCHIATRIC: No depression, anxiety, mood swings, or difficulty sleeping  HEME/LYMPH: No easy bruising, or bleeding gums  ALLERY AND IMMUNOLOGIC: No hives or eczema	    [ x] All others negative	  [ ] Unable to obtain    PHYSICAL EXAM:  T(C): 36.7 (10-16-19 @ 04:49), Max: 36.7 (10-15-19 @ 12:13)  HR: 68 (10-16-19 @ 10:40) (68 - 79)  BP: 156/74 (10-16-19 @ 10:40) (124/67 - 168/80)  RR: 18 (10-16-19 @ 10:40) (18 - 18)  SpO2: 97% (10-16-19 @ 10:40) (96% - 97%)  Wt(kg): --  I&O's Summary    15 Oct 2019 07:01  -  16 Oct 2019 07:00  --------------------------------------------------------  IN: 530 mL / OUT: 200 mL / NET: 330 mL    16 Oct 2019 07:01  -  16 Oct 2019 11:15  --------------------------------------------------------  IN: 0 mL / OUT: 100 mL / NET: -100 mL        Appearance:  	  HEENT:   Normal oral mucosa, PERRL, EOMI; + post surgical changes on the frontal region of the and + L frontal hematoma   Carotid: UNABLE TO ASSESS DUE TO C- COLLAR IN PLACE   Lymphatic: No lymphadenopathy  Cardiovascular:  + s1 s2 RRR  Respiratory:  CTA b/l no wheezing rhonchi or rales   Psychiatry:  AAO x 3  Gastrointestinal:  Soft, Non-tender, + BS	  Skin: No rashes, No ecchymoses, No cyanosis	  Neurologic:  no focal neurologic deficits   Extremities:  no edema cyanosis or clubbing       LABS:	 	    CBC Full  -  ( 16 Oct 2019 06:35 )  WBC Count : 12.13 K/uL  Hemoglobin : 12.9 g/dL  Hematocrit : 39.2 %  Platelet Count - Automated : 240 K/uL  Mean Cell Volume : 93.3 fl  Mean Cell Hemoglobin : 30.7 pg  Mean Cell Hemoglobin Concentration : 32.9 gm/dL  Auto Neutrophil # : x  Auto Lymphocyte # : x  Auto Monocyte # : x  Auto Eosinophil # : x  Auto Basophil # : x  Auto Neutrophil % : x  Auto Lymphocyte % : x  Auto Monocyte % : x  Auto Eosinophil % : x  Auto Basophil % : x    10-15    139  |  105  |  8   ----------------------------<  98  3.9   |  23  |  0.60    Ca    8.7      15 Oct 2019 07:11            Assessment:  82 y/o Female PMH ? 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  - MR of the cervical spine: decreased size in caliber of the left common and  left internal carotid artery segment, as noted on CTA,. Dominant patent  right internal carotid artery,    2. ? Dissection of the R vertebral artery   - MRA of the cervical spine without fat saturation done     Plan:  - recommend US duplex of the carotid arteries (when feasible as patient has a c-collar on)   - if not contraindicated from a Neurosurgical stand point start ASA 81mg po q daily and start high intensity statin (Crestor 20mg po q daily / Lipitor 40mg )    - no acute endovascular intervention of the L ICA at this time due to acute ongoing medical conditions; recommend follow up in the outpatient setting   - Neurosurgery input appreciated     Thank you  Salima Beck D.O.     Vascular Cardiology Service   [facility] [address]  Office [phone]  email:   [email or web address]  Progress note     [place] [id number]              EMAIL [email or web address]   OFFICE [phone]    CC:   L common carotid stenosis and L internal carotid artery stenosis    INTERVAL HISTORY:  Patient is hemodynamically stable with no complaints of chest pain or shortness of breath   C-collar in place     Allergies  No Known Allergies  Intolerances      MEDICATIONS:  acetaminophen   Tablet .. 650 milliGRAM(s) Oral every 6 hours PRN  levothyroxine 50 MICROGram(s) Oral daily  simvastatin 10 milliGRAM(s) Oral at bedtime  BACItracin   Ointment 1 Application(s) Topical two times a day      PAST MEDICAL & SURGICAL HISTORY:  Carotid stenosis  Hypothyroidism  Hyperlipidemia  Hypertension  Stroke: no residual deficits  H/O abdominal hysterectomy  Brain aneurysm: s/p repair  H/O total knee replacement  History of craniotomy      FAMILY HISTORY:  No pertinent family history: stroke      SOCIAL HISTORY:  unchanged    REVIEW OF SYSTEMS:  CONSTITUTIONAL: No fever, weight loss, or fatigue  EYES: No eye pain, visual disturbances, or discharge  ENMT:  No difficulty hearing, tinnitus, vertigo; No sinus or throat pain  NECK: No pain or stiffness  RESPIRATORY: No cough, wheezing, chills or hemoptysis; No Shortness of Breath  CARDIOVASCULAR: No chest pain, palpitations, passing out, dizziness, or leg swelling  GASTROINTESTINAL: No abdominal or epigastric pain. No nausea, vomiting, or hematemesis; No diarrhea or constipation. No melena or hematochezia.  GENITOURINARY: No dysuria, frequency, hematuria, or incontinence  NEUROLOGICAL: No headaches, memory loss, loss of strength, numbness, or tremors  SKIN: No itching, burning, rashes, or lesions   LYMPH Nodes: No enlarged glands  ENDOCRINE: No heat or cold intolerance; No hair loss  MUSCULOSKELETAL: No joint pain or swelling; No muscle, back, or extremity pain  PSYCHIATRIC: No depression, anxiety, mood swings, or difficulty sleeping  HEME/LYMPH: No easy bruising, or bleeding gums  ALLERY AND IMMUNOLOGIC: No hives or eczema	    [ x] All others negative	  [ ] Unable to obtain    PHYSICAL EXAM:  T(C): 36.7 (10-16-19 @ 04:49), Max: 36.7 (10-15-19 @ 12:13)  HR: 68 (10-16-19 @ 10:40) (68 - 79)  BP: 156/74 (10-16-19 @ 10:40) (124/67 - 168/80)  RR: 18 (10-16-19 @ 10:40) (18 - 18)  SpO2: 97% (10-16-19 @ 10:40) (96% - 97%)  Wt(kg): --  I&O's Summary    15 Oct 2019 07:01  -  16 Oct 2019 07:00  --------------------------------------------------------  IN: 530 mL / OUT: 200 mL / NET: 330 mL    16 Oct 2019 07:01  -  16 Oct 2019 11:15  --------------------------------------------------------  IN: 0 mL / OUT: 100 mL / NET: -100 mL        Appearance:  	  HEENT:   Normal oral mucosa, PERRL, EOMI; + post surgical changes on the frontal region of the and + L frontal hematoma   Carotid: UNABLE TO ASSESS DUE TO C- COLLAR IN PLACE   Lymphatic: No lymphadenopathy  Cardiovascular:  + s1 s2 RRR  Respiratory:  CTA b/l no wheezing rhonchi or rales   Psychiatry:  AAO x 3  Gastrointestinal:  Soft, Non-tender, + BS	  Skin: No rashes, No ecchymoses, No cyanosis	  Neurologic:  no focal neurologic deficits   Extremities:  no edema cyanosis or clubbing       LABS:	 	    CBC Full  -  ( 16 Oct 2019 06:35 )  WBC Count : 12.13 K/uL  Hemoglobin : 12.9 g/dL  Hematocrit : 39.2 %  Platelet Count - Automated : 240 K/uL  Mean Cell Volume : 93.3 fl  Mean Cell Hemoglobin : 30.7 pg  Mean Cell Hemoglobin Concentration : 32.9 gm/dL  Auto Neutrophil # : x  Auto Lymphocyte # : x  Auto Monocyte # : x  Auto Eosinophil # : x  Auto Basophil # : x  Auto Neutrophil % : x  Auto Lymphocyte % : x  Auto Monocyte % : x  Auto Eosinophil % : x  Auto Basophil % : x    10-15    139  |  105  |  8   ----------------------------<  98  3.9   |  23  |  0.60    Ca    8.7      15 Oct 2019 07:11            Assessment:  84 y/o Female PMH ? 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  - MR of the cervical spine: decreased size in caliber of the left common and  left internal carotid artery segment, as noted on CTA,. Dominant patent  right internal carotid artery,    2. ? Dissection of the R vertebral artery   - MRA of the cervical spine without fat saturation done     Plan:  - recommend US duplex of the carotid arteries (when feasible as patient has a c-collar on)   - if not contraindicated from a Neurosurgical stand point start ASA 81mg po q daily and start high intensity statin (Crestor 20mg po q daily / Lipitor 40mg )    - no acute endovascular intervention of the L ICA at this time due to acute ongoing medical conditions; recommend follow up in the outpatient setting   - Neurosurgery input appreciated     Thank you  Salima Beck D.O.     Vascular Cardiology Service   [facility] [address]  Office [phone]  email:   [email or web address]

## 2019-10-16 NOTE — SPEECH LANGUAGE PATHOLOGY EVALUATION - SLP ORIENTATION
Oriented x 2 person. Pt reported, "She is in the place where people are sick." Unable to provide month or year, despite phonemic cues.

## 2019-10-17 PROCEDURE — 99232 SBSQ HOSP IP/OBS MODERATE 35: CPT

## 2019-10-17 PROCEDURE — 99233 SBSQ HOSP IP/OBS HIGH 50: CPT

## 2019-10-17 RX ORDER — ATORVASTATIN CALCIUM 80 MG/1
40 TABLET, FILM COATED ORAL AT BEDTIME
Refills: 0 | Status: DISCONTINUED | OUTPATIENT
Start: 2019-10-17 | End: 2019-10-18

## 2019-10-17 RX ORDER — ASPIRIN/CALCIUM CARB/MAGNESIUM 324 MG
81 TABLET ORAL DAILY
Refills: 0 | Status: DISCONTINUED | OUTPATIENT
Start: 2019-10-17 | End: 2019-10-18

## 2019-10-17 RX ADMIN — Medication 650 MILLIGRAM(S): at 07:33

## 2019-10-17 RX ADMIN — Medication 50 MICROGRAM(S): at 05:02

## 2019-10-17 RX ADMIN — Medication 81 MILLIGRAM(S): at 13:54

## 2019-10-17 RX ADMIN — ATORVASTATIN CALCIUM 40 MILLIGRAM(S): 80 TABLET, FILM COATED ORAL at 21:39

## 2019-10-17 RX ADMIN — Medication 1 APPLICATION(S): at 16:31

## 2019-10-17 RX ADMIN — Medication 1 APPLICATION(S): at 05:02

## 2019-10-17 RX ADMIN — Medication 650 MILLIGRAM(S): at 05:06

## 2019-10-17 NOTE — CHART NOTE - NSCHARTNOTEFT_GEN_A_CORE
Spoke with Dr Bandar Lau regarding pts left hand comminuted displaced Lt thumb fracture- Says he evaluated pt on Monday, will not need surgical repair at this time, can follow up as out pt

## 2019-10-17 NOTE — PROGRESS NOTE ADULT - ATTENDING COMMENTS
Would perform renal duplex to assess for FMD (given what looks like dissection of the L CCA)  Start antiplatelet and statin therapy   Needs better BP control - would start either ACE-i or Betablocker (to reduce shearing forces)    Yashira [id number]
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    Not the typical appearance of atherosclerotic plaque.  Nonetheless would treat this with antiplatelet and statin therapy.   Would be nice to get a carotid duplex to better assess when safe to do so (no rush)  No role for endovascular intervention    [place] [id number]
#L carotid stenosis  -long segment stenosis.  -unable ot get duplex given C-collar.   -vacsular cards consulted for fruther evaluation.

## 2019-10-17 NOTE — PROGRESS NOTE ADULT - PROVIDER SPECIALTY LIST ADULT
Hospitalist
Internal Medicine
Neurosurgery
Vascular Cardiology
Hospitalist

## 2019-10-17 NOTE — PROGRESS NOTE ADULT - REASON FOR ADMISSION
cervical spine fracture

## 2019-10-17 NOTE — PROGRESS NOTE ADULT - PROBLEM SELECTOR PLAN 3
discussed personally with hand surgeon.  -this is an unstable fx, however no acuity to place pins.  -NP discussed with hand surgeon, he does not want any acute intervention at this time and wants patient to follow up as an outpatient.

## 2019-10-17 NOTE — PROGRESS NOTE ADULT - PROBLEM SELECTOR PLAN 5
-c/w levothyroxine 50mcg qd for now

## 2019-10-17 NOTE — PROGRESS NOTE ADULT - PROBLEM SELECTOR PLAN 2
TTE negative, no events on tele, no acute CVA.   -unclear etiology, but workup negative so far, no need for further inpatient workup.   -orhtostatics still pending.

## 2019-10-17 NOTE — PROGRESS NOTE ADULT - SUBJECTIVE AND OBJECTIVE BOX
Vascular Cardiology  Progress note     SPECTRA [id number]              EMAIL [email or web address]   OFFICE [phone]    CC:  L common carotid stenosis and L internal carotid artery stenosis    INTERVAL HISTORY:  Patient is hemodynamically stable with no complaints of chest pain or shortness of breath   C-collar in place     Allergies  No Known Allergies  Intolerances      MEDICATIONS:  acetaminophen   Tablet .. 650 milliGRAM(s) Oral every 6 hours PRN  levothyroxine 50 MICROGram(s) Oral daily  simvastatin 10 milliGRAM(s) Oral at bedtime  BACItracin   Ointment 1 Application(s) Topical two times a day      PAST MEDICAL & SURGICAL HISTORY:  Carotid stenosis  Hypothyroidism  Hyperlipidemia  Hypertension  Stroke: no residual deficits  H/O abdominal hysterectomy  Brain aneurysm: s/p repair  H/O total knee replacement  History of craniotomy      FAMILY HISTORY:  No pertinent family history: stroke      SOCIAL HISTORY:  unchanged    REVIEW OF SYSTEMS:  CONSTITUTIONAL: No fever, weight loss, or fatigue  EYES: No eye pain, visual disturbances, or discharge  ENMT:  No difficulty hearing, tinnitus, vertigo; No sinus or throat pain  NECK: No pain or stiffness  RESPIRATORY: No cough, wheezing, chills or hemoptysis; No Shortness of Breath  CARDIOVASCULAR: No chest pain, palpitations, passing out, dizziness, or leg swelling  GASTROINTESTINAL: No abdominal or epigastric pain. No nausea, vomiting, or hematemesis; No diarrhea or constipation. No melena or hematochezia.  GENITOURINARY: No dysuria, frequency, hematuria, or incontinence  NEUROLOGICAL: No headaches, memory loss, loss of strength, numbness, or tremors  SKIN: No itching, burning, rashes, or lesions   LYMPH Nodes: No enlarged glands  ENDOCRINE: No heat or cold intolerance; No hair loss  MUSCULOSKELETAL: No joint pain or swelling; No muscle, back, or extremity pain  PSYCHIATRIC: No depression, anxiety, mood swings, or difficulty sleeping  HEME/LYMPH: No easy bruising, or bleeding gums  ALLERY AND IMMUNOLOGIC: No hives or eczema	    [ x] All others negative	  [ ] Unable to obtain    PHYSICAL EXAM:  T(C): 36.4 (10-17-19 @ 04:23), Max: 36.9 (10-16-19 @ 12:25)  HR: 74 (10-17-19 @ 04:23) (74 - 92)  BP: 157/75 (10-17-19 @ 04:23) (155/75 - 163/90)  RR: 18 (10-17-19 @ 04:23) (17 - 18)  SpO2: 97% (10-17-19 @ 04:23) (95% - 97%)  Wt(kg): --  I&O's Summary    16 Oct 2019 07:01  -  17 Oct 2019 07:00  --------------------------------------------------------  IN: 560 mL / OUT: 700 mL / NET: -140 mL    17 Oct 2019 07:01  -  17 Oct 2019 12:06  --------------------------------------------------------  IN: 240 mL / OUT: 200 mL / NET: 40 mL        Appearance:  	  HEENT:   Normal oral mucosa, PERRL, EOMI; + post surgical changes on the frontal region of the and + L frontal hematoma   Carotid: UNABLE TO ASSESS DUE TO C- COLLAR IN PLACE   Lymphatic: No lymphadenopathy  Cardiovascular:  + s1 s2 RRR  Respiratory:  CTA b/l no wheezing rhonchi or rales   Psychiatry:  AAO x 3  Gastrointestinal:  Soft, Non-tender, + BS	  Skin: No rashes, No ecchymoses, No cyanosis	  Neurologic:  no focal neurologic deficits   Extremities:  no edema cyanosis or clubbing       LABS:	 	    CBC Full  -  ( 16 Oct 2019 06:35 )  WBC Count : 12.13 K/uL  Hemoglobin : 12.9 g/dL  Hematocrit : 39.2 %  Platelet Count - Automated : 240 K/uL  Mean Cell Volume : 93.3 fl  Mean Cell Hemoglobin : 30.7 pg  Mean Cell Hemoglobin Concentration : 32.9 gm/dL  Auto Neutrophil # : x  Auto Lymphocyte # : x  Auto Monocyte # : x  Auto Eosinophil # : x  Auto Basophil # : x  Auto Neutrophil % : x  Auto Lymphocyte % : x  Auto Monocyte % : x  Auto Eosinophil % : x  Auto Basophil % : x                Assessment:  82 y/o Female PMH ? 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  - MR of the cervical spine: decreased size in caliber of the left common and  left internal carotid artery segment, as noted on CTA,. Dominant patent  right internal carotid artery,    2. ? Dissection of the R vertebral artery / L internal carotid artery   - MRA of the cervical spine without fat saturation done     Plan:  - recommend US duplex of the carotid arteries (when feasible as patient has a c-collar on)   - recommend US of the renal artery   -start ASA 81mg po q daily and start high intensity statin (Crestor 20mg po q daily / Lipitor 40mg )    - no acute endovascular intervention of the L ICA at this time due to acute ongoing medical conditions; recommend follow up in the outpatient setting   - Neurosurgery input appreciated     Thank you  Salima Beck D.O.     Vascular Cardiology Service   [facility] [address]  Office [phone]  email:   [email or web address]

## 2019-10-17 NOTE — PROGRESS NOTE ADULT - ASSESSMENT
82 y/o Female hx Stroke on ASA&plavix, carotid stenosis, HTN, HLD, Hypothyroidism, Brain aneurysm s/p clipping presenting to the ED BIBEMS after witnessed fall in setting of possible vaso-vagal response found to have acute fracture of cervical spine & 1st metacarpal left hand.
83F s/p syncopal fall presents with neck pain and C2 pars fracture into the transverse foramen.     - CTA Negative  - MRI C-spine due to midline tenderness on exam  - Keep C-collar for now  - Pain control
82 y/o Female hx Stroke on ASA&plavix, carotid stenosis, HTN, HLD, Hypothyroidism, Brain aneurysm s/p clipping presenting to the ED BIBEMS after witnessed fall in setting of possible vaso-vagal response found to have acute fracture of cervical spine & 1st metacarpal left hand.
82 y/o Female hx Stroke on ASA&plavix, carotid stenosis, HTN, HLD, Hypothyroidism, Brain aneurysm s/p clipping presenting to the ED BIBEMS after witnessed fall in setting of possible vaso-vagal response found to have acute fracture of cervical spine & 1st metacarpal left hand.

## 2019-10-17 NOTE — PROGRESS NOTE ADULT - PROBLEM SELECTOR PLAN 1
-CT imaging reveals non-displaced C2 fracture through the left C2 pars into the transverse foramen  -Continue Cervical Collar  -discussed with neurosurgery reisdent who states patient "cleared" from their standpoint without need for MR, but wanted to continue cervical collar.   -MR reviewed, has long segment stenosis, no interveniton at this time given no acute CVA  -MRA with dissection of vert. artery is "not likely, though not entirely excluded".  -follow up with vascular cards as outaptient, startd ASA 81 and high intensity statin .

## 2019-10-17 NOTE — PROGRESS NOTE ADULT - PROBLEM SELECTOR PLAN 6
c/w lisinopril 2.5mg qd

## 2019-10-17 NOTE — PROGRESS NOTE ADULT - PROBLEM SELECTOR PROBLEM 3
Metacarpal bone fracture

## 2019-10-17 NOTE — PROGRESS NOTE ADULT - PROBLEM SELECTOR PROBLEM 1
Cervical vertebral fracture

## 2019-10-18 ENCOUNTER — TRANSCRIPTION ENCOUNTER (OUTPATIENT)
Age: 84
End: 2019-10-18

## 2019-10-18 VITALS
HEART RATE: 70 BPM | DIASTOLIC BLOOD PRESSURE: 77 MMHG | SYSTOLIC BLOOD PRESSURE: 164 MMHG | TEMPERATURE: 98 F | OXYGEN SATURATION: 94 % | RESPIRATION RATE: 18 BRPM

## 2019-10-18 LAB
ANION GAP SERPL CALC-SCNC: 12 MMOL/L — SIGNIFICANT CHANGE UP (ref 5–17)
BUN SERPL-MCNC: 17 MG/DL — SIGNIFICANT CHANGE UP (ref 7–23)
CALCIUM SERPL-MCNC: 8.8 MG/DL — SIGNIFICANT CHANGE UP (ref 8.4–10.5)
CHLORIDE SERPL-SCNC: 104 MMOL/L — SIGNIFICANT CHANGE UP (ref 96–108)
CO2 SERPL-SCNC: 26 MMOL/L — SIGNIFICANT CHANGE UP (ref 22–31)
CREAT SERPL-MCNC: 0.66 MG/DL — SIGNIFICANT CHANGE UP (ref 0.5–1.3)
GLUCOSE SERPL-MCNC: 101 MG/DL — HIGH (ref 70–99)
HCT VFR BLD CALC: 36.9 % — SIGNIFICANT CHANGE UP (ref 34.5–45)
HGB BLD-MCNC: 12.2 G/DL — SIGNIFICANT CHANGE UP (ref 11.5–15.5)
MCHC RBC-ENTMCNC: 30.7 PG — SIGNIFICANT CHANGE UP (ref 27–34)
MCHC RBC-ENTMCNC: 33.1 GM/DL — SIGNIFICANT CHANGE UP (ref 32–36)
MCV RBC AUTO: 92.7 FL — SIGNIFICANT CHANGE UP (ref 80–100)
NRBC # BLD: 0 /100 WBCS — SIGNIFICANT CHANGE UP (ref 0–0)
PLATELET # BLD AUTO: 259 K/UL — SIGNIFICANT CHANGE UP (ref 150–400)
POTASSIUM SERPL-MCNC: 4.1 MMOL/L — SIGNIFICANT CHANGE UP (ref 3.5–5.3)
POTASSIUM SERPL-SCNC: 4.1 MMOL/L — SIGNIFICANT CHANGE UP (ref 3.5–5.3)
RBC # BLD: 3.98 M/UL — SIGNIFICANT CHANGE UP (ref 3.8–5.2)
RBC # FLD: 13.1 % — SIGNIFICANT CHANGE UP (ref 10.3–14.5)
SODIUM SERPL-SCNC: 142 MMOL/L — SIGNIFICANT CHANGE UP (ref 135–145)
WBC # BLD: 9.03 K/UL — SIGNIFICANT CHANGE UP (ref 3.8–10.5)
WBC # FLD AUTO: 9.03 K/UL — SIGNIFICANT CHANGE UP (ref 3.8–10.5)

## 2019-10-18 PROCEDURE — 90715 TDAP VACCINE 7 YRS/> IM: CPT

## 2019-10-18 PROCEDURE — 72125 CT NECK SPINE W/O DYE: CPT

## 2019-10-18 PROCEDURE — 93306 TTE W/DOPPLER COMPLETE: CPT

## 2019-10-18 PROCEDURE — 71045 X-RAY EXAM CHEST 1 VIEW: CPT

## 2019-10-18 PROCEDURE — 70546 MR ANGIOGRAPH HEAD W/O&W/DYE: CPT

## 2019-10-18 PROCEDURE — 72170 X-RAY EXAM OF PELVIS: CPT

## 2019-10-18 PROCEDURE — 93975 VASCULAR STUDY: CPT | Mod: 26

## 2019-10-18 PROCEDURE — 85610 PROTHROMBIN TIME: CPT

## 2019-10-18 PROCEDURE — 97162 PT EVAL MOD COMPLEX 30 MIN: CPT

## 2019-10-18 PROCEDURE — 84484 ASSAY OF TROPONIN QUANT: CPT

## 2019-10-18 PROCEDURE — 71250 CT THORAX DX C-: CPT

## 2019-10-18 PROCEDURE — 93975 VASCULAR STUDY: CPT

## 2019-10-18 PROCEDURE — 85730 THROMBOPLASTIN TIME PARTIAL: CPT

## 2019-10-18 PROCEDURE — 83735 ASSAY OF MAGNESIUM: CPT

## 2019-10-18 PROCEDURE — 80053 COMPREHEN METABOLIC PANEL: CPT

## 2019-10-18 PROCEDURE — 85027 COMPLETE CBC AUTOMATED: CPT

## 2019-10-18 PROCEDURE — 92523 SPEECH SOUND LANG COMPREHEN: CPT

## 2019-10-18 PROCEDURE — 73130 X-RAY EXAM OF HAND: CPT

## 2019-10-18 PROCEDURE — 29125 APPL SHORT ARM SPLINT STATIC: CPT | Mod: LT

## 2019-10-18 PROCEDURE — 90471 IMMUNIZATION ADMIN: CPT

## 2019-10-18 PROCEDURE — 82746 ASSAY OF FOLIC ACID SERUM: CPT

## 2019-10-18 PROCEDURE — 99239 HOSP IP/OBS DSCHRG MGMT >30: CPT

## 2019-10-18 PROCEDURE — 82607 VITAMIN B-12: CPT

## 2019-10-18 PROCEDURE — 81001 URINALYSIS AUTO W/SCOPE: CPT

## 2019-10-18 PROCEDURE — 72141 MRI NECK SPINE W/O DYE: CPT

## 2019-10-18 PROCEDURE — 92610 EVALUATE SWALLOWING FUNCTION: CPT

## 2019-10-18 PROCEDURE — 70450 CT HEAD/BRAIN W/O DYE: CPT

## 2019-10-18 PROCEDURE — 99285 EMERGENCY DEPT VISIT HI MDM: CPT | Mod: 25

## 2019-10-18 PROCEDURE — 93005 ELECTROCARDIOGRAM TRACING: CPT | Mod: XU

## 2019-10-18 PROCEDURE — 70551 MRI BRAIN STEM W/O DYE: CPT

## 2019-10-18 PROCEDURE — 80048 BASIC METABOLIC PNL TOTAL CA: CPT

## 2019-10-18 PROCEDURE — 70498 CT ANGIOGRAPHY NECK: CPT

## 2019-10-18 PROCEDURE — 86780 TREPONEMA PALLIDUM: CPT

## 2019-10-18 PROCEDURE — 84100 ASSAY OF PHOSPHORUS: CPT

## 2019-10-18 PROCEDURE — 70549 MR ANGIOGRAPH NECK W/O&W/DYE: CPT

## 2019-10-18 PROCEDURE — A9585: CPT

## 2019-10-18 RX ORDER — LEVOTHYROXINE SODIUM 125 MCG
1 TABLET ORAL
Qty: 0 | Refills: 0 | DISCHARGE
Start: 2019-10-18

## 2019-10-18 RX ORDER — ASPIRIN/CALCIUM CARB/MAGNESIUM 324 MG
1 TABLET ORAL
Qty: 0 | Refills: 0 | DISCHARGE
Start: 2019-10-18

## 2019-10-18 RX ORDER — BACITRACIN ZINC 500 UNIT/G
1 OINTMENT IN PACKET (EA) TOPICAL
Qty: 0 | Refills: 0 | DISCHARGE
Start: 2019-10-18

## 2019-10-18 RX ORDER — ATORVASTATIN CALCIUM 80 MG/1
1 TABLET, FILM COATED ORAL
Qty: 0 | Refills: 0 | DISCHARGE
Start: 2019-10-18

## 2019-10-18 RX ADMIN — Medication 650 MILLIGRAM(S): at 01:30

## 2019-10-18 RX ADMIN — Medication 1 APPLICATION(S): at 05:37

## 2019-10-18 RX ADMIN — Medication 650 MILLIGRAM(S): at 00:24

## 2019-10-18 RX ADMIN — Medication 81 MILLIGRAM(S): at 09:06

## 2019-10-18 RX ADMIN — Medication 50 MICROGRAM(S): at 05:37

## 2019-10-18 NOTE — DISCHARGE NOTE NURSING/CASE MANAGEMENT/SOCIAL WORK - PATIENT PORTAL LINK FT
You can access the FollowMyHealth Patient Portal offered by [facility] by registering at the following website: [email or web address]. By joining [facility]’s FollowMyHealth portal, you will also be able to view your health information using other applications (apps) compatible with our system.

## 2019-10-18 NOTE — DISCHARGE NOTE PROVIDER - HOSPITAL COURSE
84 y/o Female hx Stroke on ASA&plavix, carotid stenosis, HTN, HLD, Hypothyroidism, Brain aneurysm s/p clipping presenting to the ED BIBEMS after witnessed fall in setting of possible vaso-vagal response found to have acute fracture of cervical spine & 1st metacarpal left hand .    Evaluated by  Neuro sx no sx interventions recommends  C-collar  and outpatient follow up  with  Dr. Miranda .     Pt was seen by Hand sx ,  for the Left first metacarpal fracture.   Options include operative pinning versus nonoperative management was reviwed with Pt and Pt opted out for SX , will follow up with DR Lau 83F h/o Stroke, carotid stenosis, HTN, HLD, Hypothyroidism, Brain aneurysm s/p clipping presenting to the ED BIBEMS after witnessed fall in setting of possible vaso-vagal response found to have acute fracture of cervical spine & 1st metacarpal left hand. CT imaging reveals non-displaced C2 fracture through the left C2 pars into the transverse foramen. patient evaluated by neurosurgery and recommended cervical collar without acute surgical intervention at this time. patient had MR brain, MRA head and neck that showed near occlusion of the L CCA and stenosis of the L ICA, no acute CVA with ?dissection of R vertebral artery. reviewed by neurosurgery and "cleared" from their standpoint for discharge and follow up. Vascular cardiology consult appreciated started on asa daily and high intensity statin.  Regarding fall, patient had EKG with TWI lead III, TTE without segmental wall motion abnormalities and no events on tele. orthostatics negative. patient sustained a metacarpal bone fracture from fall and was evaluated by Hand surgeon - Dr Bandar Lau and stated that patient will not need surgical repair at this time and can follow up as out pt. patient counselled to wear ccollar at all times and to follow up with neurosurgery in 1-2 weeks. patient to follow up with Hand surgery - Dr. Lau in 1-2 weeks.

## 2019-10-18 NOTE — DISCHARGE NOTE NURSING/CASE MANAGEMENT/SOCIAL WORK - NSDCPEPTSTRK_GEN_ALL_CORE
Risk factors for stroke/Stroke support groups for patients, families, and friends/Stroke warning signs and symptoms/Signs and symptoms of stroke/Call 911 for stroke/Need for follow up after discharge/Prescribed medications/Stroke education booklet

## 2019-10-18 NOTE — DISCHARGE NOTE PROVIDER - CARE PROVIDERS DIRECT ADDRESSES
[email or web address]

## 2019-10-18 NOTE — DISCHARGE NOTE PROVIDER - CARE PROVIDER_API CALL
Jad Miranda)  Neurological Surgery  [address], [address]  [place] [address]  Phone: [phone]  Fax: [phone]  Follow Up Time:     Bandar Lau)  Plastic Surgery; Surgery of the Hand  [address]  [place] [address]  Phone: [phone]  Fax: [phone]  Follow Up Time:

## 2019-10-18 NOTE — CHART NOTE - NSCHARTNOTEFT_GEN_A_CORE
patient seen and examined. no acute complaints.     83F h/o Stroke, carotid stenosis, HTN, HLD, Hypothyroidism, Brain aneurysm s/p clipping presenting to the ED BIBEMS after witnessed fall in setting of possible vaso-vagal response found to have acute fracture of cervical spine & 1st metacarpal left hand. CT imaging reveals non-displaced C2 fracture through the left C2 pars into the transverse foramen. patient evaluated by neurosurgery and recommended cervical collar without acute surgical intervention at this time. patient had MR brain, MRA head and neck that showed near occlusion of the L CCA and stenosis of the L ICA, no acute CVA with ?dissection of R vertebral artery. reviewed by neurosurgery and "cleared" from their standpoint for discharge and follow up. Vascular cardiology consult appreciated started on asa daily and high intensity statin.  Regarding fall, patient had EKG with TWI lead III, TTE without segmental wall motion abnormalities and no events on tele. orthostatics negative. patient sustained a metacarpal bone fracture from fall and was evaluated by Hand surgeon - Dr Bandar Lau and stated that patient will not need surgical repair at this time and can follow up as out pt. patient counselled to wear ccollar at all times and to follow up with neurosurgery in 1-2 weeks. patient to follow up with Hand surgery - Dr. Lau in 1-2 weeks.     discharge time - 45 minutes  Dr. M. Luke  Medicine Hospitalist  [phone]

## 2019-10-18 NOTE — DISCHARGE NOTE PROVIDER - NSDCCPCAREPLAN_GEN_ALL_CORE_FT
PRINCIPAL DISCHARGE DIAGNOSIS  Diagnosis: Cervical vertebral fracture  Assessment and Plan of Treatment: please weat c collar at all times   Please follow up with DR Miranda with jodi week of discharge      SECONDARY DISCHARGE DIAGNOSES  Diagnosis: Hypertension  Assessment and Plan of Treatment: Follow up with your medical doctor to establish long term blood pressure treatment goals.      Diagnosis: Metacarpal bone fracture  Assessment and Plan of Treatment: c/w Soft cast   tylenol prn   please follow up with DR Lau    Diagnosis: Fall  Assessment and Plan of Treatment: Orthoststic BP negetive   Fall precautions    Diagnosis: Back pain  Assessment and Plan of Treatment:     Diagnosis: Neck pain on left side  Assessment and Plan of Treatment:     Diagnosis: Head injury  Assessment and Plan of Treatment:

## 2019-10-21 RX ORDER — CLOPIDOGREL BISULFATE 75 MG/1
1 TABLET, FILM COATED ORAL
Qty: 0 | Refills: 0 | DISCHARGE

## 2019-10-21 RX ORDER — SIMVASTATIN 20 MG/1
1 TABLET, FILM COATED ORAL
Qty: 0 | Refills: 0 | DISCHARGE

## 2019-10-21 RX ORDER — LEVOTHYROXINE SODIUM 125 MCG
1 TABLET ORAL
Qty: 0 | Refills: 0 | DISCHARGE

## 2019-10-21 RX ORDER — ASPIRIN/CALCIUM CARB/MAGNESIUM 324 MG
1 TABLET ORAL
Qty: 0 | Refills: 0 | DISCHARGE

## 2019-11-26 ENCOUNTER — MOBILE ON CALL (OUTPATIENT)
Age: 84
End: 2019-11-26

## 2019-11-26 DIAGNOSIS — Z87.81 PERSONAL HISTORY OF (HEALED) TRAUMATIC FRACTURE: ICD-10-CM

## 2019-12-09 PROBLEM — E78.5 HYPERLIPIDEMIA, UNSPECIFIED: Chronic | Status: ACTIVE | Noted: 2019-10-12

## 2019-12-09 PROBLEM — E03.9 HYPOTHYROIDISM, UNSPECIFIED: Chronic | Status: ACTIVE | Noted: 2019-10-12

## 2019-12-09 PROBLEM — I10 ESSENTIAL (PRIMARY) HYPERTENSION: Chronic | Status: ACTIVE | Noted: 2019-10-12

## 2019-12-09 PROBLEM — I65.29 OCCLUSION AND STENOSIS OF UNSPECIFIED CAROTID ARTERY: Chronic | Status: ACTIVE | Noted: 2019-10-12

## 2019-12-09 PROBLEM — I63.9 CEREBRAL INFARCTION, UNSPECIFIED: Chronic | Status: ACTIVE | Noted: 2019-10-12

## 2019-12-11 ENCOUNTER — APPOINTMENT (OUTPATIENT)
Dept: ORTHOPEDIC SURGERY | Facility: CLINIC | Age: 84
End: 2019-12-11
Payer: MEDICARE

## 2019-12-11 VITALS
HEART RATE: 90 BPM | BODY MASS INDEX: 24.84 KG/M2 | SYSTOLIC BLOOD PRESSURE: 184 MMHG | DIASTOLIC BLOOD PRESSURE: 69 MMHG | WEIGHT: 135 LBS | HEIGHT: 62 IN

## 2019-12-11 PROCEDURE — 99203 OFFICE O/P NEW LOW 30 MIN: CPT

## 2019-12-11 PROCEDURE — 72040 X-RAY EXAM NECK SPINE 2-3 VW: CPT

## 2019-12-11 NOTE — PHYSICAL EXAM
[Ataxic] : not ataxic [de-identified] : She is in a cervical collar. Stable neurologic exam. She is in the left upper extremity splint. [de-identified] : AP lateral cervical x-rays reveals maintained alignment.\par \par He was imaging from the hospital reveals a C2 fracture with minimal displacement

## 2019-12-11 NOTE — DISCUSSION/SUMMARY
[de-identified] : She will continue with her cervical collar. Followup in one month for repeat x-rays flexion-extension out of collar

## 2019-12-11 NOTE — HISTORY OF PRESENT ILLNESS
[de-identified] : This 84-year-old female fall from a Hospital for C2 fracture. She denies any numbness tingling or weakness. She is in a cervical collar.

## 2020-01-22 ENCOUNTER — APPOINTMENT (OUTPATIENT)
Dept: ORTHOPEDIC SURGERY | Facility: CLINIC | Age: 85
End: 2020-01-22
Payer: MEDICARE

## 2020-01-22 DIAGNOSIS — S12.101A: ICD-10-CM

## 2020-01-22 PROCEDURE — 72040 X-RAY EXAM NECK SPINE 2-3 VW: CPT

## 2020-01-22 PROCEDURE — 99214 OFFICE O/P EST MOD 30 MIN: CPT

## 2020-01-22 NOTE — HISTORY OF PRESENT ILLNESS
[de-identified] : Ms. Ledezma is an 84-year-old female who presents to the office for a follow-up visit regarding a C2 fracture. She denies any numbness tingling or weakness. She has some upper neck pain.  She continues to wear a cervical collar.  She is complaining of dizziness.

## 2020-01-22 NOTE — DISCUSSION/SUMMARY
[de-identified] : She will wean from her collar. She will note any changes or worsening of her symptoms

## 2020-01-22 NOTE — PHYSICAL EXAM
[Ataxic] : not ataxic [de-identified] :  No tenderness to palpation over posterior cervical spine. Some decreased range of motion but without pain. Stable neurologic exam. She is in the left upper extremity splint. [de-identified] : flexion extension views did not reveal any gross instability\par

## 2022-05-10 NOTE — PHYSICAL THERAPY INITIAL EVALUATION ADULT - TRANSFER SKILLS, REHAB EVAL
Spiritual Plan of Care    Pt Name: Vonda Gregorio  Pt : 1933  Date: May 10, 2022    Visit Type:  In person    Reason for Visit: Sacramental/Communion    Visited With: Patient not available    Length of Visit: 5 minutes    Spiritual Care Consult Needed: No needs at this time      Taxonomy:    Â· Intended Effects: Build relationship of care and support  Â· Methods: Offer spiritual/Druze support
needs device/independent
